# Patient Record
Sex: FEMALE | Race: WHITE | NOT HISPANIC OR LATINO | Employment: OTHER | URBAN - METROPOLITAN AREA
[De-identification: names, ages, dates, MRNs, and addresses within clinical notes are randomized per-mention and may not be internally consistent; named-entity substitution may affect disease eponyms.]

---

## 2017-08-29 ENCOUNTER — HOSPITAL ENCOUNTER (OUTPATIENT)
Dept: CARDIOLOGY | Facility: HOSPITAL | Age: 72
Discharge: HOME | End: 2017-08-29
Attending: INTERNAL MEDICINE

## 2017-08-29 ENCOUNTER — OFFICE VISIT - HEALTHEAST (OUTPATIENT)
Dept: CARDIOLOGY | Facility: CLINIC | Age: 72
End: 2017-08-29

## 2017-08-29 DIAGNOSIS — I48.20 CHRONIC ATRIAL FIBRILLATION (H): ICD-10-CM

## 2017-08-29 DIAGNOSIS — I66.9 CEREBRAL ARTERY OCCLUSION: ICD-10-CM

## 2017-08-29 DIAGNOSIS — I35.8 AORTIC VALVE SCLEROSIS: ICD-10-CM

## 2017-08-29 DIAGNOSIS — I10 ESSENTIAL HYPERTENSION: ICD-10-CM

## 2017-08-29 LAB
AORTIC ROOT: 2.6 CM
AORTIC VALVE MEAN VELOCITY: 130 CM/S
AV DIMENSIONLESS INDEX VTI: 0.5
AV MEAN GRADIENT: 7 MMHG
AV PEAK GRADIENT: 11.4 MMHG
AV VALVE AREA: 1.6 CM2
AV VELOCITY RATIO: 0.5
BSA FOR ECHO PROCEDURE: 1.9 M2
CV BLOOD PRESSURE: NORMAL MMHG
CV ECHO HEIGHT: 67 IN
CV ECHO WEIGHT: 168 LBS
DOP CALC AO PEAK VEL: 169 CM/S
DOP CALC AO VTI: 40.7 CM
DOP CALC LVOT AREA: 3.14 CM2
DOP CALC LVOT DIAMETER: 2 CM
DOP CALC LVOT PEAK VEL: 92.9 CM/S
DOP CALC LVOT STROKE VOLUME: 65 CM3
DOP CALCLVOT PEAK VEL VTI: 20.7 CM
EJECTION FRACTION: 66 % (ref 55–75)
FRACTIONAL SHORTENING: 26.5 % (ref 28–44)
INTERVENTRICULAR SEPTUM IN END DIASTOLE: 1.2 CM (ref 0.6–0.9)
IVS/PW RATIO: 1.3
LA AREA 1: 32.4 CM2
LA AREA 2: 22.1 CM2
LEFT ATRIUM LENGTH: 5.9 CM
LEFT ATRIUM SIZE: 3.8 CM
LEFT ATRIUM TO AORTIC ROOT RATIO: 1.46 NO UNITS
LEFT ATRIUM VOLUME INDEX: 54.3 ML/M2
LEFT ATRIUM VOLUME: 103.2 CM3
LEFT VENTRICLE CARDIAC INDEX: 2.1 L/MIN/M2
LEFT VENTRICLE CARDIAC OUTPUT: 3.9 L/MIN
LEFT VENTRICLE DIASTOLIC VOLUME INDEX: 40.6 CM3/M2 (ref 34–74)
LEFT VENTRICLE DIASTOLIC VOLUME: 77.2 CM3 (ref 46–106)
LEFT VENTRICLE HEART RATE: 60 BPM
LEFT VENTRICLE MASS INDEX: 79.6 G/M2
LEFT VENTRICLE SYSTOLIC VOLUME INDEX: 13.8 CM3/M2 (ref 11–31)
LEFT VENTRICLE SYSTOLIC VOLUME: 26.3 CM3 (ref 14–42)
LEFT VENTRICULAR INTERNAL DIMENSION IN DIASTOLE: 4.19 CM (ref 3.8–5.2)
LEFT VENTRICULAR INTERNAL DIMENSION IN SYSTOLE: 3.08 CM (ref 2.2–3.5)
LEFT VENTRICULAR MASS: 151.3 G
LEFT VENTRICULAR OUTFLOW TRACT MEAN GRADIENT: 2 MMHG
LEFT VENTRICULAR OUTFLOW TRACT MEAN VELOCITY: 62.3 CM/S
LEFT VENTRICULAR OUTFLOW TRACT PEAK GRADIENT: 3 MMHG
LEFT VENTRICULAR POSTERIOR WALL IN END DIASTOLE: 0.95 CM (ref 0.6–0.9)
LV STROKE VOLUME INDEX: 34.2 ML/M2
MITRAL VALVE DECELERATION SLOPE: 6310 MM/S2
MITRAL VALVE PRESSURE HALF-TIME: 48 MS
MV AVERAGE E/E' RATIO: 9.1 CM/S
MV DECELERATION TIME: 134 MS
MV E'TISSUE VEL-LAT: 14.6 CM/S
MV E'TISSUE VEL-MED: 8.32 CM/S
MV LATERAL E/E' RATIO: 7.1
MV MEDIAL E/E' RATIO: 12.5
MV PEAK E VELOCITY: 104 CM/S
MV VALVE AREA PRESSURE 1/2 METHOD: 4.6 CM2
NUC REST DIASTOLIC VOLUME INDEX: 2688 LBS
NUC REST SYSTOLIC VOLUME INDEX: 67 IN
TRICUSPID REGURGITATION PEAK PRESSURE GRADIENT: 32.3 MMHG
TRICUSPID VALVE ANULAR PLANE SYSTOLIC EXCURSION: 1.6 CM
TRICUSPID VALVE PEAK REGURGITANT VELOCITY: 284 CM/S

## 2017-08-29 RX ORDER — TIMOLOL MALEATE 5 MG/ML
1 SOLUTION/ DROPS OPHTHALMIC DAILY
Status: SHIPPED | COMMUNITY
Start: 2017-08-21

## 2017-08-29 ASSESSMENT — MIFFLIN-ST. JEOR: SCORE: 1274.67

## 2017-08-30 ENCOUNTER — AMBULATORY - HEALTHEAST (OUTPATIENT)
Dept: CARDIOLOGY | Facility: CLINIC | Age: 72
End: 2017-08-30

## 2017-08-30 DIAGNOSIS — I10 HTN (HYPERTENSION): ICD-10-CM

## 2017-09-02 ENCOUNTER — AMBULATORY - HEALTHEAST (OUTPATIENT)
Dept: CARDIOLOGY | Facility: CLINIC | Age: 72
End: 2017-09-02

## 2017-09-02 DIAGNOSIS — I10 UNCONTROLLED HYPERTENSION: ICD-10-CM

## 2017-09-06 ENCOUNTER — COMMUNICATION - HEALTHEAST (OUTPATIENT)
Dept: CARDIOLOGY | Facility: CLINIC | Age: 72
End: 2017-09-06

## 2017-09-06 DIAGNOSIS — I10 HTN (HYPERTENSION): ICD-10-CM

## 2017-09-08 ENCOUNTER — COMMUNICATION - HEALTHEAST (OUTPATIENT)
Dept: CARDIOLOGY | Facility: CLINIC | Age: 72
End: 2017-09-08

## 2017-09-11 ENCOUNTER — AMBULATORY - HEALTHEAST (OUTPATIENT)
Dept: CARDIOLOGY | Facility: CLINIC | Age: 72
End: 2017-09-11

## 2017-09-11 DIAGNOSIS — I10 HTN (HYPERTENSION): ICD-10-CM

## 2017-09-15 ENCOUNTER — COMMUNICATION - HEALTHEAST (OUTPATIENT)
Dept: CARDIOLOGY | Facility: CLINIC | Age: 72
End: 2017-09-15

## 2017-10-30 ENCOUNTER — AMBULATORY - HEALTHEAST (OUTPATIENT)
Dept: SCHEDULING | Facility: CLINIC | Age: 72
End: 2017-10-30

## 2017-10-30 ENCOUNTER — RECORDS - HEALTHEAST (OUTPATIENT)
Dept: ADMINISTRATIVE | Facility: OTHER | Age: 72
End: 2017-10-30

## 2017-10-30 DIAGNOSIS — M81.0 OSTEOPOROSIS: ICD-10-CM

## 2018-09-07 ENCOUNTER — OFFICE VISIT - HEALTHEAST (OUTPATIENT)
Dept: CARDIOLOGY | Facility: CLINIC | Age: 73
End: 2018-09-07

## 2018-09-07 DIAGNOSIS — I48.20 CHRONIC ATRIAL FIBRILLATION (H): ICD-10-CM

## 2018-09-07 DIAGNOSIS — I10 ESSENTIAL HYPERTENSION: ICD-10-CM

## 2018-09-07 RX ORDER — CLINDAMYCIN HCL 150 MG
150 CAPSULE ORAL DAILY
Status: SHIPPED | COMMUNITY
Start: 2017-07-19

## 2018-09-07 RX ORDER — ALENDRONATE SODIUM 70 MG/1
70 TABLET ORAL DAILY
Status: SHIPPED | COMMUNITY
Start: 2017-12-05

## 2018-09-07 RX ORDER — POTASSIUM CHLORIDE 1500 MG/1
20 TABLET, EXTENDED RELEASE ORAL DAILY
Status: SHIPPED | COMMUNITY
Start: 2017-01-17

## 2019-04-23 ENCOUNTER — COMMUNICATION - HEALTHEAST (OUTPATIENT)
Dept: ANTICOAGULATION | Facility: CLINIC | Age: 74
End: 2019-04-23

## 2019-04-23 DIAGNOSIS — I48.91 A-FIB (H): ICD-10-CM

## 2019-09-13 ENCOUNTER — OFFICE VISIT - HEALTHEAST (OUTPATIENT)
Dept: CARDIOLOGY | Facility: CLINIC | Age: 74
End: 2019-09-13

## 2019-09-13 DIAGNOSIS — I66.9 CEREBRAL ARTERY OCCLUSION: ICD-10-CM

## 2019-09-13 DIAGNOSIS — I27.29 PULMONARY HYPERTENSIVE VENOUS DISEASE (H): ICD-10-CM

## 2019-09-13 DIAGNOSIS — I48.20 CHRONIC ATRIAL FIBRILLATION (H): ICD-10-CM

## 2019-09-13 ASSESSMENT — MIFFLIN-ST. JEOR: SCORE: 1174.88

## 2019-09-20 ENCOUNTER — HOSPITAL ENCOUNTER (OUTPATIENT)
Dept: CARDIOLOGY | Facility: HOSPITAL | Age: 74
Discharge: HOME | End: 2019-09-20
Attending: INTERNAL MEDICINE

## 2019-09-20 DIAGNOSIS — I27.29 PULMONARY HYPERTENSIVE VENOUS DISEASE (H): ICD-10-CM

## 2019-09-20 DIAGNOSIS — I48.20 CHRONIC ATRIAL FIBRILLATION (H): ICD-10-CM

## 2019-09-20 ASSESSMENT — MIFFLIN-ST. JEOR: SCORE: 1174.88

## 2019-09-23 LAB
AORTIC ROOT: 2.9 CM
AORTIC VALVE MEAN VELOCITY: 132 CM/S
AV CUSP SEPERATION: 0.7 CM
AV CUSP SEPERATION: 0.7 CM
AV DIMENSIONLESS INDEX VTI: 0.6
AV MEAN GRADIENT: 7 MMHG
AV PEAK GRADIENT: 11.8 MMHG
AV VALVE AREA: 1.3 CM2
BSA FOR ECHO PROCEDURE: 1.77 M2
CV ECHO HEIGHT: 67 IN
CV ECHO WEIGHT: 146 LBS
DOP CALC AO PEAK VEL: 172 CM/S
DOP CALC AO VTI: 40.7 CM
DOP CALC LVOT AREA: 2.27 CM2
DOP CALC LVOT DIAMETER: 1.7 CM
DOP CALC LVOT STROKE VOLUME: 52 CM3
DOP CALC MV VTI: 20.8 CM
DOP CALCLVOT PEAK VEL VTI: 22.9 CM
EJECTION FRACTION: 64 % (ref 55–75)
FRACTIONAL SHORTENING: 16.7 % (ref 28–44)
INTERVENTRICULAR SEPTUM IN END DIASTOLE: 1.1 CM (ref 0.6–0.9)
IVS/PW RATIO: 0.9
LA AREA 1: 21 CM2
LA AREA 2: 16.1 CM2
LEFT ATRIUM LENGTH: 5.38 CM
LEFT ATRIUM SIZE: 3.3 CM
LEFT ATRIUM VOLUME INDEX: 30.2 ML/M2
LEFT ATRIUM VOLUME: 53.4 ML
LEFT VENTRICLE CARDIAC INDEX: 1.9 L/MIN/M2
LEFT VENTRICLE CARDIAC OUTPUT: 3.3 L/MIN
LEFT VENTRICLE DIASTOLIC VOLUME INDEX: 18.6 CM3/M2 (ref 29–61)
LEFT VENTRICLE DIASTOLIC VOLUME: 33 CM3 (ref 46–106)
LEFT VENTRICLE HEART RATE: 64 BPM
LEFT VENTRICLE MASS INDEX: 74.9 G/M2
LEFT VENTRICLE SYSTOLIC VOLUME INDEX: 6.8 CM3/M2 (ref 8–24)
LEFT VENTRICLE SYSTOLIC VOLUME: 12 CM3 (ref 14–42)
LEFT VENTRICULAR INTERNAL DIMENSION IN DIASTOLE: 3.6 CM (ref 3.8–5.2)
LEFT VENTRICULAR INTERNAL DIMENSION IN SYSTOLE: 3 CM (ref 2.2–3.5)
LEFT VENTRICULAR MASS: 132.7 G
LEFT VENTRICULAR OUTFLOW TRACT MEAN GRADIENT: 2 MMHG
LEFT VENTRICULAR OUTFLOW TRACT MEAN VELOCITY: 68.9 CM/S
LEFT VENTRICULAR POSTERIOR WALL IN END DIASTOLE: 1.2 CM (ref 0.6–0.9)
LV STROKE VOLUME INDEX: 29.4 ML/M2
MITRAL VALVE DECELERATION SLOPE: 4260 MM/S2
MITRAL VALVE E/A RATIO: 13.1
MITRAL VALVE MEAN INFLOW VELOCITY: 58.1 CM/S
MITRAL VALVE PEAK VELOCITY: 113 CM/S
MITRAL VALVE PRESSURE HALF-TIME: 80 MS
MV AREA VTI: 2.5 CM2
MV AVERAGE E/E' RATIO: 9.8 CM/S
MV DECELERATION TIME: 130 MS
MV E'TISSUE VEL-LAT: 11.6 CM/S
MV E'TISSUE VEL-MED: 7.31 CM/S
MV LATERAL E/E' RATIO: 8
MV MEAN GRADIENT: 2 MMHG
MV MEDIAL E/E' RATIO: 12.7
MV PEAK A VELOCITY: 7.06 CM/S
MV PEAK E VELOCITY: 92.8 CM/S
MV PEAK GRADIENT: 5.1 MMHG
MV VALVE AREA BY CONTINUITY EQUATION: 2.5 CM2
MV VALVE AREA PRESSURE 1/2 METHOD: 2.8 CM2
NUC REST DIASTOLIC VOLUME INDEX: 2336 LBS
NUC REST SYSTOLIC VOLUME INDEX: 67 IN
PR MAX PG: 7 MMHG
PR PEAK VELOCITY: 130 CM/S
TRICUSPID REGURGITATION PEAK PRESSURE GRADIENT: 27.7 MMHG
TRICUSPID VALVE ANULAR PLANE SYSTOLIC EXCURSION: 1.2 CM
TRICUSPID VALVE PEAK REGURGITANT VELOCITY: 263 CM/S

## 2019-10-07 ENCOUNTER — COMMUNICATION - HEALTHEAST (OUTPATIENT)
Dept: CARDIOLOGY | Facility: CLINIC | Age: 74
End: 2019-10-07

## 2019-10-07 DIAGNOSIS — I10 UNCONTROLLED HYPERTENSION: ICD-10-CM

## 2019-10-17 ENCOUNTER — COMMUNICATION - HEALTHEAST (OUTPATIENT)
Dept: CARDIOLOGY | Facility: CLINIC | Age: 74
End: 2019-10-17

## 2019-10-17 DIAGNOSIS — I10 UNCONTROLLED HYPERTENSION: ICD-10-CM

## 2019-10-17 RX ORDER — LISINOPRIL AND HYDROCHLOROTHIAZIDE 12.5; 2 MG/1; MG/1
1 TABLET ORAL DAILY
Refills: 0 | Status: SHIPPED
Start: 2019-10-17

## 2019-11-08 ENCOUNTER — COMMUNICATION - HEALTHEAST (OUTPATIENT)
Dept: CARDIOLOGY | Facility: CLINIC | Age: 74
End: 2019-11-08

## 2019-11-25 ENCOUNTER — COMMUNICATION - HEALTHEAST (OUTPATIENT)
Dept: CARDIOLOGY | Facility: CLINIC | Age: 74
End: 2019-11-25

## 2019-12-06 ENCOUNTER — RECORDS - HEALTHEAST (OUTPATIENT)
Dept: ADMINISTRATIVE | Facility: OTHER | Age: 74
End: 2019-12-06

## 2019-12-17 ENCOUNTER — COMMUNICATION - HEALTHEAST (OUTPATIENT)
Dept: CARDIOLOGY | Facility: CLINIC | Age: 74
End: 2019-12-17

## 2019-12-19 ENCOUNTER — AMBULATORY - HEALTHEAST (OUTPATIENT)
Dept: CARDIOLOGY | Facility: CLINIC | Age: 74
End: 2019-12-19

## 2019-12-19 DIAGNOSIS — I48.0 PAROXYSMAL ATRIAL FIBRILLATION (H): ICD-10-CM

## 2019-12-19 LAB
ATRIAL RATE - MUSE: NORMAL
DIASTOLIC BLOOD PRESSURE - MUSE: NORMAL
INTERPRETATION ECG - MUSE: NORMAL
P AXIS - MUSE: NORMAL
PR INTERVAL - MUSE: NORMAL
QRS DURATION - MUSE: 100 MS
QT - MUSE: 392 MS
QTC - MUSE: 401 MS
R AXIS - MUSE: 13 DEGREES
SYSTOLIC BLOOD PRESSURE - MUSE: NORMAL
T AXIS - MUSE: 14 DEGREES
VENTRICULAR RATE- MUSE: 63 BPM

## 2019-12-19 ASSESSMENT — MIFFLIN-ST. JEOR: SCORE: 1159

## 2019-12-23 ENCOUNTER — AMBULATORY - HEALTHEAST (OUTPATIENT)
Dept: CARDIOLOGY | Facility: CLINIC | Age: 74
End: 2019-12-23

## 2020-10-29 ENCOUNTER — COMMUNICATION - HEALTHEAST (OUTPATIENT)
Dept: CARDIOLOGY | Facility: CLINIC | Age: 75
End: 2020-10-29

## 2020-10-30 ENCOUNTER — OFFICE VISIT - HEALTHEAST (OUTPATIENT)
Dept: CARDIOLOGY | Facility: CLINIC | Age: 75
End: 2020-10-30

## 2020-10-30 DIAGNOSIS — I35.8 AORTIC VALVE SCLEROSIS: ICD-10-CM

## 2020-10-30 DIAGNOSIS — I48.20 CHRONIC ATRIAL FIBRILLATION (H): ICD-10-CM

## 2020-10-30 DIAGNOSIS — I27.29 PULMONARY HYPERTENSIVE VENOUS DISEASE (H): ICD-10-CM

## 2020-10-30 ASSESSMENT — MIFFLIN-ST. JEOR: SCORE: 1154.46

## 2020-11-17 ENCOUNTER — HOSPITAL ENCOUNTER (OUTPATIENT)
Dept: CARDIOLOGY | Facility: HOSPITAL | Age: 75
Discharge: HOME | End: 2020-11-17
Attending: INTERNAL MEDICINE

## 2020-11-17 DIAGNOSIS — I27.29 PULMONARY HYPERTENSIVE VENOUS DISEASE (H): ICD-10-CM

## 2020-11-17 DIAGNOSIS — I48.20 CHRONIC ATRIAL FIBRILLATION (H): ICD-10-CM

## 2020-11-17 DIAGNOSIS — I35.8 AORTIC VALVE SCLEROSIS: ICD-10-CM

## 2020-11-17 LAB
AORTIC ROOT: 3.1 CM
AORTIC VALVE MEAN VELOCITY: 142 CM/S
ASCENDING AORTA: 3.3 CM
AV DIMENSIONLESS INDEX VTI: 0.5
AV MEAN GRADIENT: 9 MMHG
AV PEAK GRADIENT: 18.5 MMHG
AV VALVE AREA: 1.4 CM2
BSA FOR ECHO PROCEDURE: 1.73 M2
CV BLOOD PRESSURE: ABNORMAL MMHG
CV ECHO HEIGHT: 68 IN
CV ECHO WEIGHT: 138 LBS
DOP CALC AO PEAK VEL: 215 CM/S
DOP CALC AO VTI: 50.5 CM
DOP CALC LVOT AREA: 3.14 CM2
DOP CALC LVOT DIAMETER: 2 CM
DOP CALC LVOT STROKE VOLUME: 72.5 CM3
DOP CALC MV VTI: 25.7 CM
DOP CALCLVOT PEAK VEL VTI: 23.1 CM
EJECTION FRACTION: 65 % (ref 55–75)
FRACTIONAL SHORTENING: 35.7 % (ref 28–44)
INTERVENTRICULAR SEPTUM IN END DIASTOLE: 1 CM (ref 0.6–0.9)
IVS/PW RATIO: 0.8
LA AREA 1: 25.4 CM2
LA AREA 2: 32.5 CM2
LEFT ATRIUM LENGTH: 7.18 CM
LEFT ATRIUM SIZE: 4 CM
LEFT ATRIUM TO AORTIC ROOT RATIO: 1.29 NO UNITS
LEFT ATRIUM VOLUME INDEX: 56.5 ML/M2
LEFT ATRIUM VOLUME: 97.7 ML
LEFT VENTRICLE CARDIAC INDEX: 3.1 L/MIN/M2
LEFT VENTRICLE CARDIAC OUTPUT: 5.4 L/MIN
LEFT VENTRICLE DIASTOLIC VOLUME INDEX: 30.1 CM3/M2 (ref 29–61)
LEFT VENTRICLE DIASTOLIC VOLUME: 52 CM3 (ref 46–106)
LEFT VENTRICLE HEART RATE: 74 BPM
LEFT VENTRICLE MASS INDEX: 96.8 G/M2
LEFT VENTRICLE SYSTOLIC VOLUME INDEX: 10.4 CM3/M2 (ref 8–24)
LEFT VENTRICLE SYSTOLIC VOLUME: 18 CM3 (ref 14–42)
LEFT VENTRICULAR INTERNAL DIMENSION IN DIASTOLE: 4.2 CM (ref 3.8–5.2)
LEFT VENTRICULAR INTERNAL DIMENSION IN SYSTOLE: 2.7 CM (ref 2.2–3.5)
LEFT VENTRICULAR MASS: 167.4 G
LEFT VENTRICULAR OUTFLOW TRACT MEAN GRADIENT: 2 MMHG
LEFT VENTRICULAR OUTFLOW TRACT MEAN VELOCITY: 63.7 CM/S
LEFT VENTRICULAR POSTERIOR WALL IN END DIASTOLE: 1.3 CM (ref 0.6–0.9)
LV STROKE VOLUME INDEX: 41.9 ML/M2
MITRAL VALVE E/A RATIO: 2.7
MITRAL VALVE MEAN INFLOW VELOCITY: 56.8 CM/S
MITRAL VALVE PEAK VELOCITY: 142 CM/S
MV AREA VTI: 2.82 CM2
MV AVERAGE E/E' RATIO: 13.6 CM/S
MV DECELERATION TIME: 158 MS
MV E'TISSUE VEL-LAT: 12.4 CM/S
MV E'TISSUE VEL-MED: 6.23 CM/S
MV LATERAL E/E' RATIO: 10.2
MV MEAN GRADIENT: 2 MMHG
MV MEDIAL E/E' RATIO: 20.4
MV PEAK A VELOCITY: 46.9 CM/S
MV PEAK E VELOCITY: 127 CM/S
MV PEAK GRADIENT: 8.1 MMHG
MV VALVE AREA BY CONTINUITY EQUATION: 2.8 CM2
NUC REST DIASTOLIC VOLUME INDEX: 2208 LBS
NUC REST SYSTOLIC VOLUME INDEX: 68 IN
TRICUSPID REGURGITATION PEAK PRESSURE GRADIENT: 30 MMHG
TRICUSPID VALVE ANULAR PLANE SYSTOLIC EXCURSION: 1.7 CM
TRICUSPID VALVE PEAK REGURGITANT VELOCITY: 274 CM/S

## 2020-11-17 ASSESSMENT — MIFFLIN-ST. JEOR: SCORE: 1154.46

## 2020-11-25 ENCOUNTER — AMBULATORY - HEALTHEAST (OUTPATIENT)
Dept: CARDIOLOGY | Facility: CLINIC | Age: 75
End: 2020-11-25

## 2021-05-31 ENCOUNTER — RECORDS - HEALTHEAST (OUTPATIENT)
Dept: ADMINISTRATIVE | Facility: CLINIC | Age: 76
End: 2021-05-31

## 2021-05-31 VITALS — HEIGHT: 67 IN | WEIGHT: 168 LBS | BODY MASS INDEX: 26.37 KG/M2

## 2021-06-02 VITALS — BODY MASS INDEX: 22.71 KG/M2 | WEIGHT: 145 LBS

## 2021-06-02 NOTE — TELEPHONE ENCOUNTER
Return call to patient who stated PCP prescribed her Lisinopril-HCTZ 20-12.5mg daily to replace  10-12.5mg dose she was previously taking in response to elevated BP she had reported 10-7-19 (refer to phone note) - patient stated she just rec'd Rx and plans to start it tomorrow - pharmacist had warned her of potential side effects of dizziness - patient inquired about when she should have her BP jose'd.    Instructed patient to have BP jose'd in 1wk after dose change and to report to PCP if sx of dizziness are not tolerated - patient verbalized understanding - med list updated.  mg

## 2021-06-02 NOTE — TELEPHONE ENCOUNTER
Return call to patient who confirmed she had jose of BP 10-2-19 which was 141/73 but has not rec'd return call from PCP regarding any medication changes so her spouse recommended she contact Dr. Pereyra.  Patient is not at home but agreed to call and clarify present dose of Lisinopril/HCTZ, then update would be forwarded to Dr. Pereyra.  mg

## 2021-06-02 NOTE — TELEPHONE ENCOUNTER
Return call to [facility] care coordinator Jeanie - informed her of Dr. Pereyra's response/recommendations - understanding verbalized.  mg

## 2021-06-02 NOTE — TELEPHONE ENCOUNTER
----- Message from Manisha Francis sent at 10/17/2019 10:16 AM CDT -----  Regarding: L  Caller: STEFANY     Primary cardiologist: Dr. Pereyra    Detailed reason for call: STEFANY needs verbal or faxed hold and/or bridge anticoagulation order. Please call back or fax info.    New or active symptoms? n/a     Best phone number: [facility] [phone], ext [phone]     Best time to contact: Any     Ok to leave a detailed message? Yes     Device? No     Additional Info: STEFANY Fax [phone]

## 2021-06-02 NOTE — TELEPHONE ENCOUNTER
----- Message from Aaron Briones sent at 10/17/2019  2:34 PM CDT -----  Regarding: Pt has medication question  General phone call:    Caller: Pt    Primary cardiologist: Dr Pereyra    Detailed reason for call: Pt has a question on her lisinopril-hydrochlorothiazide (ZESTORETIC) 20-25 mg per tablet - dose question - (Dr Hubbard her PCP suggested an increase in dose)  She wanted a call back to disscuss    New or active symptoms? na  Best phone number: [phone]  Best time to contact: any  Ok to leave a detailed message? yes  Device? no    Additional Info: thank you

## 2021-06-02 NOTE — TELEPHONE ENCOUNTER
----- Message from Tamia Majano sent at 10/7/2019  1:44 PM CDT -----  General phone call:    Caller: Patient  Primary cardiologist: Dr. Pereyra  Detailed reason for call: Patient is now available to call regarding her test  New or active symptoms?   Best phone number: [phone]  Best time to contact: Anytime  Ok to leave a detailed message? yes  Device? no    Additional Info:

## 2021-06-02 NOTE — TELEPHONE ENCOUNTER
"Phone call to patient - informed her of Dr. Pereyra's response - patient verbalized understanding and stated her PCP recommended abd CT which is sched for 10-22-19 because she has been experiencing \"gurgling in her stomach which is embarrassing\" - reiterated to patient that PCP can determine need for sooner f/u with Dr. Pereyra for BP mgmt if needed.  mg  "

## 2021-06-02 NOTE — TELEPHONE ENCOUNTER
"Per PCP 9-27-19 clinic note \"HTN: BP elevated here today. Not realized until after visit. Will have nursing call patient on Monday. BP normal at recent cardiology visit. Will have her return for a nurse appt to recheck BP if able and if still elevated, would increase lisinopril-HCTZ to 20mg-12.5mg daily.\"    Left message for patient requesting call back to address questions/concerns.  mg    "

## 2021-06-02 NOTE — TELEPHONE ENCOUNTER
----- Message from Stefany Abdi sent at 10/7/2019  9:50 AM CDT -----  General phone call:  PATIENT IS HAVING ELEVATED BLOOD PRESSURE, AND DR SEXTON WANTED TO KNOW WHEN THIS HAPPENED.  THIS WAS CHECKED AT [facility].  NURSE TOLD HER SHE WOULD GET A CALL FROM PCP, SHE IS STILL WAITING FOR THAT CALL SINCE LAST Wednesday.  CAN YOU PLEASE CALL THE PATIENT ABOUT HER ELEVATED BLOOD PRESSURE?  Caller: PATIENT  Primary cardiologist: DR SEXTON  Detailed reason for call: SEE ABOVE  New or active symptoms? YES, SEE ABOVE  Best phone number: [phone]  Best time to contact: ANY TIME  Ok to leave a detailed message? YES  Device? NO    Additional Info:

## 2021-06-02 NOTE — TELEPHONE ENCOUNTER
Rec'd return call from patient who stated she is presently taking Lisinopril/HCTZ 10-12.5mg daily and Atenolol 25mg daily - med listed updated to reflect current ZESTORETIC dose - informed patient that update would be forwarded to Dr. Pereyra for recommendations - patient verbalized understanding.    Please review patient update, PCP 9-27-19 visit note and 10-2-19 nurse only encounter - any new orders?  mg

## 2021-06-03 VITALS
HEIGHT: 67 IN | BODY MASS INDEX: 22.91 KG/M2 | DIASTOLIC BLOOD PRESSURE: 82 MMHG | RESPIRATION RATE: 10 BRPM | SYSTOLIC BLOOD PRESSURE: 110 MMHG | WEIGHT: 146 LBS | HEART RATE: 54 BPM

## 2021-06-03 VITALS — BODY MASS INDEX: 22.91 KG/M2 | WEIGHT: 146 LBS | HEIGHT: 67 IN

## 2021-06-03 NOTE — TELEPHONE ENCOUNTER
Dr. Pereyra,  Please see patient concerns below.  Patient and her  are requesting you to please recommend a GI specialist for her newly diagnosed pancreatic cyst.  Any recommendations?  Thank you,  Gris  --------------------------------  Called patient to discuss concerns. Patient and her  are requesting Dr. Pereyra to please review the 11/6/19 [facility] note in Care Everywhere and Imaging completed 11/2/19- abdominal MRI showing a pancreatic cyst.  Patient is frustrated over how long it is taking to get into [facility] GI specialist; therefore, are in search for one in the [facility] system.  Patient and her , Edvin, would like a recommendation of a GI specialist, in pancreatic cysts, from Dr. Pereyra.    Informed patient Dr. Pereyra is out of the clinic today and will not be addressed until next week. She verbalized understanding and is agreeable to plan.

## 2021-06-03 NOTE — TELEPHONE ENCOUNTER
----- Message from Debbie Godinez sent at 11/8/2019  1:57 PM CST -----  General phone call:    Caller: Pt and  came to clinic because they can't get through on phones  Primary cardiologist: Hafsa  Detailed reason for call: Pt diagnosed w/ cyst? on pancreas and want your help to find a [facility] doc  New or active symptoms? yes  Best phone number: [facility] @ [phone]  Best time to contact:   Ok to leave a detailed message?   Device? No    Additional Info:

## 2021-06-03 NOTE — TELEPHONE ENCOUNTER
Called patient's , Edvin, with message below.  [facility].  Patient is scheduled to have a colonoscopy 12/5/19. [facility] will be requesting Dr. Pereyra be notified of hold warfarin recommendation.

## 2021-06-03 NOTE — TELEPHONE ENCOUNTER
There are no GI specialist within the [facility] system.  [facility] is the group that services [facility].  I am uncertain if there is anyone who specializes in pancreatic cysts.  They could contact the clinic and see if they can provide names of people that specialize in pancreas disorders.    KL

## 2021-06-03 NOTE — TELEPHONE ENCOUNTER
Return call to patient's spouse - informed Edvin that he would need to contact patient's PCP to obtain Warfarin hold orders [facility] - Edvin verbalized understanding and agreed to f/u with PCP INR nurse.  mg

## 2021-06-04 VITALS
TEMPERATURE: 98 F | SYSTOLIC BLOOD PRESSURE: 127 MMHG | RESPIRATION RATE: 20 BRPM | WEIGHT: 139 LBS | BODY MASS INDEX: 21.07 KG/M2 | DIASTOLIC BLOOD PRESSURE: 78 MMHG | HEART RATE: 79 BPM | HEIGHT: 68 IN

## 2021-06-04 NOTE — TELEPHONE ENCOUNTER
Zestar Study Consent Visit    Study description: ECG and PPG Study: Zestar Study      Jennifer Wall a 74 y.o. female , was contacted by today to discuss participation in the Zestar study.     The patient called the Clinical Trials Office to inquire about study participation.  The consent form was reviewed with the patient and .     The review of the study included:    Study purpose     Conflict of interest    Device description      Study visits    Risks of participation    Benefits (if any)    Alternatives    Voluntary participation    Confidentiality     Compensation/costs of participation    Study stipends    Injury and legal rights    The subject was queried in regards to her willingness to continue and come in for scheduled appointment. her questions were answered to her satisfaction.   The patient has given her preliminary agreement to volunteer to participate in the above noted study.     Plan: Jennifer Wall will come to [facility] on 12/19/2019 [date] to continue consent process. If she continues to agrees to participate, the study visit will be done on the same day.  she was instructed to eat as usual before coming for study visit and take medications as usual too. she was encouraged to wear comfortable clothes and shoes to the study appointment.       Ayla Marshall RN

## 2021-06-05 VITALS
DIASTOLIC BLOOD PRESSURE: 76 MMHG | WEIGHT: 138 LBS | SYSTOLIC BLOOD PRESSURE: 136 MMHG | BODY MASS INDEX: 20.92 KG/M2 | HEART RATE: 64 BPM | RESPIRATION RATE: 16 BRPM | HEIGHT: 68 IN

## 2021-06-05 VITALS — BODY MASS INDEX: 20.92 KG/M2 | WEIGHT: 138 LBS | HEIGHT: 68 IN

## 2021-06-12 NOTE — PATIENT INSTRUCTIONS - HE
1. Continue current medications  2. Set up echo and holter to follow up on valve and heart rate. We will call with results.

## 2021-06-12 NOTE — TELEPHONE ENCOUNTER
Wellness Screening Tool  Symptom Screening:  Do you have one of the following NEW symptoms:    Fever (subjective or >100.0)?  No    A new cough?  No    Shortness of breath?  No     Chills? No     New loss of taste or smell? No     Generalized body aches? No     New persistent headache? No     New sore throat? No     Nausea, vomiting, or diarrhea?  No    Within the past 2 weeks, have you been exposed to someone with a known positive illness below:    COVID-19 (known or suspected)?  No    Chicken pox?  No    Mealses?  No    Pertussis?  No    Patient notified of visitor policy- They may have one person accompany them to their appointment, but they will need to wear a mask and will be screened upon arrival for symptoms: Yes  Pt informed to wear a mask: Yes  Pt notified if they develop any symptoms listed above, prior to their appointment, they are to call the clinic directly at [phone] for further instructions.  Yes  Patient's appointment status: Patient will be seen in clinic as scheduled on 10/30

## 2021-06-25 NOTE — PROGRESS NOTES
Progress Notes by Yolis Pereyra MD at 8/29/2017  8:50 AM     Author: Yolis Pereyra MD Service: -- Author Type: Physician    Filed: 8/29/2017  9:11 AM Encounter Date: 8/29/2017 Status: Signed    : Yolis Pereyra MD (Physician)           Click to link to [facility] HEART CARE NOTE    Thank you, Dr. Dean, for asking the [facility] Care team to see Ms. Jennifer Wall to follow-up on chronic atrial fibrillation and mild valvular heart disease.    Assessment/Recommendations   Assessment:    1.  Chronic atrial fibrillation, rate well controlled.  Jennifer remains on anticoagulation to reduce her risks of thromboembolic events.    2.  Mild valvular heart disease.  Her last echo 2-1/2 years ago suggested mild thickening of the aortic valve and mild mitral regurgitation.  I suspect she has mild aortic valve stenosis at this point.  Suggested we repeat an echocardiogram.  3.  Essential hypertension, inadequately controlled.  Her blood pressure was elevated when she arrived at 160/70.  May need follow-up of her blood pressures per primary physician    Plan:  1.  Continue current medications  2.  Schedule echocardiogram with further recommendations to follow       History of Present Illness    Ms. Jennifer Wall is a 72 y.o. female with history of chronic atrial fibrillation and mild mitral regurgitation who presents today for follow-up visit.  Since I last saw her 1-1/2 years ago, she has been doing well.  She denies any clear complaints of palpitations.  She denies any decline in exercise capacity.  She is beginning to have some issues with her balance.  No complaints of any chest discomfort or shortness of breath.    ECG (personally reviewed): No ECG today    Cardiac Imaging Studies (personally reviewed): No recent imaging studies     Physical Examination Review of Systems   Vitals:    08/29/17 0848   BP: 160/70   Pulse: 60   Resp: 16     Body mass index is 26.31 kg/(m^2).  Wt Readings from  Last 3 Encounters:   08/29/17 168 lb (76.2 kg)   01/13/16 174 lb (78.9 kg)   11/03/15 175 lb (79.4 kg)     General Appearance:   Awake, Alert, No acute distress.   HEENT:  No scleral icterus; the mucous membranes were pink and moist.   Neck: No cervical bruits or jugular venous distention    Chest: The spine was straight. The chest was symmetric.   Lungs:   Respirations unlabored; the lungs are clear to auscultation. No wheezing   Cardiovascular:    Irregularly irregular rhythm.  S1, S2 normal.  No murmur, gallop or rub   Abdomen:  No organomegaly, masses, bruits, or tenderness. Bowels sounds are present   Extremities:  No peripheral edema bilaterally. Varicose veins noted   Skin: No xanthelasma. Warm, Dry.   Musculoskeletal: No tenderness.   Neurologic: Mood and affect are appropriate.    General: Weight Loss  Eyes: WNL  Ears/Nose/Throat: WNL  Lungs: WNL  Heart: Irregular Heartbeat, Leg Swelling  Stomach: Constipation  Bladder: WNL  Muscle/Joints: WNL  Skin: WNL  Nervous System: WNL  Mental Health: WNL     Blood: Easy Bruising     Medical History  Surgical History Family History Social History   -Chronic atrial fibrillation  -Essential hypertension  -Mild mitral regurgitation  -Ischemic stroke  -Glaucoma Past Surgical History:   Procedure Laterality Date   ? HYSTERECTOMY      Family History   Problem Relation Age of Onset   ? No Medical Problems Mother    ? No Medical Problems Father    ? No Medical Problems Sister    ? No Medical Problems Daughter    ? No Medical Problems Maternal Grandmother    ? No Medical Problems Maternal Grandfather    ? No Medical Problems Paternal Grandmother    ? No Medical Problems Paternal Grandfather    ? No Medical Problems Maternal Aunt    ? No Medical Problems Paternal Aunt    ? BRCA 1/2 Neg Hx    ? Breast cancer Neg Hx    ? Cancer Neg Hx    ? Colon cancer Neg Hx    ? Endometrial cancer Neg Hx    ? Ovarian cancer Neg Hx     Social History     Social History   ? Marital status:       Spouse name: N/A   ? Number of children: N/A   ? Years of education: N/A     Occupational History   ? Not on file.     Social History Main Topics   ? Smoking status: Never Smoker   ? Smokeless tobacco: Never Used   ? Alcohol use Not on file   ? Drug use: Not on file   ? Sexual activity: Not on file     Other Topics Concern   ? Not on file     Social History Narrative          Medications  Allergies   Current Outpatient Prescriptions   Medication Sig Dispense Refill   ? atenolol (TENORMIN) 25 MG tablet TAKE ONE TABLET BY MOUTH EVERY DAY 90 tablet 3   ? atorvastatin (LIPITOR) 10 MG tablet TAKE ONE TABLET BY MOUTH ONCE DAILY 90 tablet 1   ? Ca-D3-mag#11-zinc-cupr-man-bor (CALTRATE 600+D PLUS MINERALS) 600 mg calcium- 800 unit-50 mg Tab Take 1 tablet by mouth 2 (two) times a day.     ? cholecalciferol, vitamin D3, 1,000 unit tablet Take 1,000 Units by mouth daily.     ? diphenoxylate-atropine (LOMOTIL) 2.5-0.025 mg per tablet Take 1-2 tablets by mouth 3 (three) times a day as needed for diarrhea.     ? fluticasone (FLONASE) 50 mcg/actuation nasal spray 2 sprays into each nostril daily.     ? multivitamin (ONE A DAY) per tablet      ? timolol maleate (TIMOPTIC) 0.5 % ophthalmic solution Administer 1 drop to the right eye daily.     ? warfarin (COUMADIN) 5 MG tablet Take 1 tablet daily 90 tablet 3     No current facility-administered medications for this visit.       Allergies   Allergen Reactions   ? Aspirin (Tartrazine Only)    ? Azithromycin    ? Latex    ? Morphine    ? Nitrofuran Analogues    ? Penicillins    ? Sulfa (Sulfonamide Antibiotics)          Lab Results    Chemistry/lipid CBC Cardiac Enzymes/BNP/TSH/INR   Lab Results   Component Value Date    CHOL 149 05/04/2015    HDL 68 05/04/2015    LDLCALC 67 05/04/2015    TRIG 71 05/04/2015    CREATININE 0.72 05/04/2015    BUN 13 05/04/2015    K 4.0 05/04/2015     05/04/2015     05/04/2015    CO2 26 05/04/2015    Lab Results   Component Value  Date    WBC 4.4 05/04/2015    HGB 13.6 05/04/2015    HCT 40.0 05/04/2015    MCV 96 05/04/2015     05/04/2015    Lab Results   Component Value Date    TSH 1.1 03/12/2013    INR 2.0 (!) 06/23/2016

## 2021-06-26 NOTE — PROGRESS NOTES
Progress Notes by Yolis Pereyra MD at 9/7/2018  9:30 AM     Author: Yolis Pereyra MD Service: -- Author Type: Physician    Filed: 9/7/2018  4:03 PM Encounter Date: 9/7/2018 Status: Signed    : Yolis Pereyra MD (Physician)           Click to link to [facility] HEART CARE NOTE    Thank you, Dr. Barba, for asking the [facility] Heart Care team to see Ms. Jennifer Wall to follow-up on chronic atrial fibrillation, essential hypertension and mild valvular disease.    Assessment/Recommendations   Assessment:    1.  Chronic atrial fibrillation, rate well controlled.  Patient remains on anticoagulation and minimize risks of thromboembolic events.  2.  Essential hypertension, controlled with addition of lisinopril-HCTZ.  3.  Aortic valve sclerosis.  This was documented on echocardiogram one year ago.  Will hold off on repeat echo this year.  4.  Weight loss.  Patient has had 22 pound weight loss over the past year.  She blames this on loss of taste and inability to smell.  She also reports new constipation issues.  I recommended follow-up with primary care physician.    Plan:  1.  Continue current medications  2.  Follow-up in 1 year or sooner if clinically indicated       History of Present Illness    Ms. Jennifer Wall is a 73 y.o. female with chronic atrial fibrillation, essential hypertension and mild valvular heart disease presents today for follow-up visit.  After her last visit a year ago, initiated lisinopril/HCTZ which resulted in significant improvement in her blood pressure.  Her lower extremity edema resolved.  She states she is feeling much better.  She does mention a significant weight loss over the past year which she attributes to poor taste and smell and the fact that she does not feel hungry.  She denies early satiety.  She has had issues with constipation recently.  Her  states her stomach is always making noise.  She has not discussed this with the primary care  physician and I encouraged her to do so.    ECG (personally reviewed): No ECG today    Cardiac Imaging Studies (personally reviewed): No recent imaging     Physical Examination Review of Systems   Vitals:    09/07/18 0934   BP: 122/76   Pulse: 60   Resp: 18     Body mass index is 22.71 kg/(m^2).  Wt Readings from Last 3 Encounters:   09/07/18 145 lb (65.8 kg)   08/29/17 168 lb (76.2 kg)   01/13/16 174 lb (78.9 kg)     General Appearance:   Awake, Alert, No acute distress.   HEENT:  No scleral icterus; the mucous membranes were pink and moist.   Neck: No cervical bruits or jugular venous distention    Chest: The spine was straight. The chest was symmetric.   Lungs:   Respirations unlabored; the lungs are clear to auscultation. No wheezing   Cardiovascular:    Irregularly irregular rhythm.  S1, S2 normal.  1/6 soft systolic murmur heard at the left upper sternal border.   Abdomen:  No organomegaly, masses, bruits, or tenderness. Bowels sounds are present   Extremities:  No peripheral edema bilaterally   Skin: No xanthelasma. Warm, Dry.   Musculoskeletal: No tenderness.   Neurologic: Mood and affect are appropriate.    General: Weight Loss  Eyes: WNL  Ears/Nose/Throat: WNL  Lungs: WNL  Heart: Irregular Heartbeat, Leg Swelling  Stomach: Constipation  Bladder: Frequent Urination at Night  Muscle/Joints: WNL  Skin: Rash  Nervous System: Loss of Balance  Mental Health: WNL     Blood: Easy Bruising     Medical History  Surgical History Family History Social History   Past Medical History:   Diagnosis Date   ? Atrial fibrillation (H)    ? Heart murmur    ? Hypertension    ? Stroke (H)     Past Surgical History:   Procedure Laterality Date   ? HYSTERECTOMY      Family History   Problem Relation Age of Onset   ? No Medical Problems Mother    ? No Medical Problems Father    ? No Medical Problems Sister    ? No Medical Problems Daughter    ? No Medical Problems Maternal Grandmother    ? No Medical Problems Maternal Grandfather     ? No Medical Problems Paternal Grandmother    ? No Medical Problems Paternal Grandfather    ? No Medical Problems Maternal Aunt    ? No Medical Problems Paternal Aunt    ? BRCA 1/2 Neg Hx    ? Breast cancer Neg Hx    ? Cancer Neg Hx    ? Colon cancer Neg Hx    ? Endometrial cancer Neg Hx    ? Ovarian cancer Neg Hx     Social History     Social History   ? Marital status:      Spouse name: N/A   ? Number of children: N/A   ? Years of education: N/A     Occupational History   ? Not on file.     Social History Main Topics   ? Smoking status: Never Smoker   ? Smokeless tobacco: Never Used   ? Alcohol use Not on file   ? Drug use: Not on file   ? Sexual activity: Not on file     Other Topics Concern   ? Not on file     Social History Narrative          Medications  Allergies   Current Outpatient Prescriptions   Medication Sig Dispense Refill   ? alendronate (FOSAMAX) 70 MG tablet Take 70 mg by mouth daily.     ? atenolol (TENORMIN) 25 MG tablet TAKE ONE TABLET BY MOUTH EVERY DAY 90 tablet 3   ? atorvastatin (LIPITOR) 10 MG tablet TAKE ONE TABLET BY MOUTH ONCE DAILY 90 tablet 1   ? Ca-D3-mag#11-zinc-cupr-man-bor (CALTRATE 600+D PLUS MINERALS) 600 mg calcium- 800 unit-50 mg Tab Take 1 tablet by mouth 2 (two) times a day.     ? cholecalciferol, vitamin D3, 1,000 unit tablet Take 1,000 Units by mouth daily.     ? clindamycin (CLEOCIN) 150 MG capsule Take 150 mg by mouth daily.     ? fluticasone (FLONASE) 50 mcg/actuation nasal spray 2 sprays into each nostril daily.     ? lisinopril-hydrochlorothiazide (ZESTORETIC) 20-12.5 mg per tablet Take 1 tablet by mouth daily. 30 tablet 1   ? multivitamin (ONE A DAY) per tablet      ? potassium chloride (K-DUR,KLOR-CON) 20 MEQ tablet Take 20 mEq by mouth daily.     ? timolol maleate (TIMOPTIC) 0.5 % ophthalmic solution Administer 1 drop to the right eye daily.     ? warfarin (COUMADIN) 5 MG tablet Take 1 tablet daily 90 tablet 3   ? diphenoxylate-atropine (LOMOTIL) 2.5-0.025  mg per tablet Take 1-2 tablets by mouth 3 (three) times a day as needed for diarrhea.       No current facility-administered medications for this visit.       Allergies   Allergen Reactions   ? Aspirin (Tartrazine Only)    ? Azithromycin    ? Latex    ? Morphine    ? Nitrofuran Analogues    ? Penicillins    ? Sulfa (Sulfonamide Antibiotics)          Lab Results    Chemistry/lipid CBC Cardiac Enzymes/BNP/TSH/INR   Lab Results   Component Value Date    CHOL 149 05/04/2015    HDL 68 05/04/2015    LDLCALC 67 05/04/2015    TRIG 71 05/04/2015    CREATININE 0.80 09/11/2017    BUN 17 09/11/2017    K 4.2 09/11/2017     09/11/2017     09/11/2017    CO2 27 09/11/2017    Lab Results   Component Value Date    WBC 4.4 05/04/2015    HGB 13.6 05/04/2015    HCT 40.0 05/04/2015    MCV 96 05/04/2015     05/04/2015    Lab Results   Component Value Date    TSH 1.1 03/12/2013    INR 2.0 (!) 06/23/2016

## 2021-06-28 NOTE — PROGRESS NOTES
Progress Notes by Kristen Hatch CNP at 12/19/2019 12:30 PM     Author: Kristen Hatch CNP Service: -- Author Type: Nurse Practitioner    Filed: 12/19/2019  4:02 PM Encounter Date: 12/19/2019 Status: Signed    : Kristen Hatch CNP (Nurse Practitioner)        Zestar Study     Zestar Study    Physical Examination  For abnormal findings, please evaluate if the finding is Clinically Significant (by 'CS') or Not Clinically Significant (by 'NCS')  General Appearance Normal  Head and Neck  Normal  Lungs    Normal  Cardiovascular NCS irregular irregular with murmur   Abdomen Abnormal- NCS slight tenderness   Musculoskeletal/ExtremitiesAbnormal- NCS right ankle +3 compared to the left +2    Lymph Nodes  Normal  Skin    Normal  Neurological   Normal   Tremor present NCS slight tremor 1/+1    If present, evaluate severity on 1-10 scale    LUISITO Ruelas CNP

## 2021-06-28 NOTE — PROGRESS NOTES
Progress Notes by Rossy Martin at 12/23/2019  9:48 AM     Author: Rossy Martin Service: -- Author Type: Patient Access    Filed: 12/23/2019  9:48 AM Encounter Date: 12/23/2019 Status: Signed    : Rossy Martin (Patient Access)         Zestar Study Device Return    Jennifer Wall returned all the devices for the Zestar study.  Jennifer Wall denies medication changes or adverse events since last visit.    Jennifer Wall has now completed their participation in the Zestar study.   Thank you for your gift of participation.    Rossy Martin

## 2021-06-28 NOTE — PROGRESS NOTES
Progress Notes by Tom Perdue at 12/19/2019 12:30 PM     Author: Tom Perdue Service: -- Author Type: Patient Access    Filed: 1/15/2020  3:43 PM Encounter Date: 12/19/2019 Status: Addendum    : Tom Perdue (Patient Access)    Related Notes: Original Note by Tom Perdue (Patient Access) filed at 12/19/2019  4:02 PM            Zestar Study Consent Visit    Study description: ECG and PPG Study: Zestar Study      Note time seated: 1249     Jennifer Wall a 74 y.o. female , was seen in 2500 today to discuss participation in the Zestar study.   The consent discussion began on 12/17/19.  Please refer to phone call note from Ayla Marshall for more details.  The consent form was reviewed with the patient and Edvin Wall.     The review of the study included:    Study purpose     Conflict of interest    Device description      Study visits    Risks of participation    Benefits (if any)    Alternatives    Voluntary participation    Confidentiality     Compensation/costs of participation    Study stipends    Injury and legal rights    The subject was provided time to review the consent form and consider participation. her questions were answered to her satisfaction.   The patient has voluntarily agreed to participate in the above noted study.     The consent form version 25 Nov. 2019 and HIPAA form version 11 June 2019 was signed 12/19/19 at 1314    The subject was provided with a copy of the consent form and HIPAA. A copy of the signed forms was forwarded to medical records.    No study procedures were done prior to Jennifer Wall providing informed consent.     Tom Perdue    Subject Restrictions During Study -Confirmed with Subject prior to any study procedures completed    Restrictions on jewelry, recreational drugs, caffeine, and exercise few days prior and during study.   1. Subjects should not consume excessive amount of caffeine (6 or more 8-oz cups of coffee, or more than 570 mg of  "caffeine from energy drinks, pills or similar substance) during their participation in the study.   2. Subjects should not consume excessive amount of alcohol for the duration of their participation in the study. A typical moderate amount is allowed during stage 3.   3. Subjects should not take any recreational drugs (including, but not limited to methamphetamines, cocaine, opioids, cannabis, LSD) for the duration of their participation in the study.   4. Subjects should not wear underwire bra or jewelry during the in-lab study (to not interfere with electrode placement and ECG data recordings).   5. Subject will not be permitted to have their cell phone or any electronic recording device on or with them during the in-lab test session(s).   6. Subjects under 22 years old will not be permitted to take ECG recordings through the ECG jermaine on the wrist-worn devices.     For study stage 3 only   1. Subjects should only do high intensity exercise (e.g. sprinting, heavy lifting, etc.) in the morning upon awakening or else not at all   2. Subjects should abstain from swimming during the time of the study   3. Subjects should only shower in the morning upon awakening (or else not at all)   4. Female subjects are strongly suggested to wear non-underwire bras throughout this stage of the study     Tom CARRI Perdue      Study Data collections   Vitals  (TPBP)     Vitals:    12/19/19 1320 12/19/19 1321 12/19/19 1322   BP: 134/87 135/81 127/78   Patient Site: Right Arm Right Arm Right Arm   Patient Position: Sitting Sitting Sitting   Cuff Size: Adult Regular Adult Regular Adult Regular   Pulse: 83 71 79   Resp:   20   Temp:  98  F (36.7  C)    TempSrc:  Oral    Weight:   139 lb (63 kg)   Height:   5' 8\" (1.727 m)      VS taken after 5 min rest     MAP 1    102  MAP 2      98   MAP 3             95          Body mass index is 21.13 kg/m .  female  1945  74 y.o.      Note time patient placed in supine position: " 1328    Ethnicity   []   or    [x]  Not  or   Race    []   or    []    []  Black or   []   or Other   [x]  White  Physical Activity Level  per subject report:    []  0- Extremely Inactive []  1- Sedentary [x]  2- Moderately Active  []  3- Vigorously Active []  4- Extremely Active  Trained Athlete    [] Yes  [x] No     Whitmore's' Skin type   [] Type 1 [x] Type 2 [] Type 3    [] Type 4 [] Type 5 [] Type 6    Subject participated in previous ECG study at [facility]: [] Yes    [x] No     Past Medical History:   Diagnosis Date   ? Atrial fibrillation (H) 2008   ? Heart murmur    ? History of cardioversion 2009   ? Hypertension 2008   ? Sleep apnea 1999    previously used CPAP. Lost weight and no longer uses CPAP or has sleep apnea   ? Stroke (H) 2008       HISTORY OF HEART RHYTHM ABNORMALITIES (check only group subject is 'randomized[' to-only 1)  []  Group 1: History of paroxysmal atrial fibrillation (no excluded medications)  []  Group 2: History of paroxysmal atrial fibrillation (on excluded medications)    []  Group 3: History of high-rate atrial fibrillation   []  Group 4: History of atrial fibrillation with rate control medications    [x]  Group 5: Permanent/persistent atrial fibrillation    []  Group 6: history of atrial flutter  []  Group 7: Frequent PVCs  []  Group 8: Frequent PACs  []  Group 9: BBB (left or right)  []  Group 10: History of 2nd Heart Block (any type)  []  Group 11: History of bigeminy, trigeminy, and/or quadgeminy  []  Group 12: History of tachycardia     Special interest allergies: active allergic skin reactions  Allergies   Allergen Reactions   ? Aspirin (Tartrazine Only)    ? Azithromycin    ? Latex    ? Morphine    ? Nitrofuran Analogues    ? Penicillins    ? Sulfa (Sulfonamide Antibiotics)        Current Outpatient Medications:   ?  alendronate (FOSAMAX) 70 MG tablet, Take 70 mg  "by mouth daily., Disp: , Rfl:   ?  atenolol (TENORMIN) 25 MG tablet, TAKE ONE TABLET BY MOUTH EVERY DAY, Disp: 90 tablet, Rfl: 3  ?  atorvastatin (LIPITOR) 10 MG tablet, TAKE ONE TABLET BY MOUTH ONCE DAILY, Disp: 90 tablet, Rfl: 1  ?  Ca-D3-mag#11-zinc-cupr-man-bor (CALTRATE 600+D PLUS MINERALS) 600 mg calcium- 800 unit-50 mg Tab, Take 1 tablet by mouth 2 (two) times a day., Disp: , Rfl:   ?  cholecalciferol, vitamin D3, 1,000 unit tablet, Take 1,000 Units by mouth daily., Disp: , Rfl:   ?  clindamycin (CLEOCIN) 150 MG capsule, Take 150 mg by mouth daily., Disp: , Rfl:   ?  diphenoxylate-atropine (LOMOTIL) 2.5-0.025 mg per tablet, Take 1-2 tablets by mouth 3 (three) times a day as needed for diarrhea., Disp: , Rfl:   ?  fluticasone (FLONASE) 50 mcg/actuation nasal spray, 2 sprays into each nostril daily., Disp: , Rfl:   ?  lisinopril-hydrochlorothiazide (PRINZIDE,ZESTORETIC) 20-12.5 mg per tablet, Take 1 tablet by mouth daily., Disp: , Rfl: 0  ?  multivitamin (ONE A DAY) per tablet, , Disp: , Rfl:   ?  potassium chloride (K-DUR,KLOR-CON) 20 MEQ tablet, Take 20 mEq by mouth daily., Disp: , Rfl:   ?  timolol maleate (TIMOPTIC) 0.5 % ophthalmic solution, Administer 1 drop to the right eye daily., Disp: , Rfl:   ?  warfarin (COUMADIN) 5 MG tablet, Take 1 tablet daily (Patient taking differently: Take 2.5 mg daily on Tue and Sat Take 3.75 mg tablets on Sun, Mon, Wed, Thur and Fri Based on INR results.), Disp: 90 tablet, Rfl: 3      10-sec 12-lead ECG & 30-sec 12-lead ECG rhythm strip done; reviewed by & PE done by Joann aHtch  Subject Questionnaire    OCCUPATION: Retired    Predominately works outdoors  [] Yes    [x] No      Hours/week spent outdoors (total, not only for work): 3     Frequently participates in \"hand intensive\" activities [] Yes [x] No    Caffeine  [x]  Never  [] Occasionally        []  Daily (1 cup/day)     []  Daily (>1 cup/day)    Alcohol    [x]  Never  [] Light (drink or 2 occasionally) []  Moderate " "(a drink or 2 almost daily)   []  Occasional-heavy (more than a few drinks <2x / month)  [] Heavy (more than a few drinks >2x / month)    Tobacco/nicotine   [x]  Never  [] Rarely  []  Frequently/ Daily     Mattress Information      Mattress type:  []  Memory foam [] Gel  [x] Innerspring (coil)  [] Airbed  [] Waterbed [] Shikibuton  [] Hybrid  [] No mattress  [] Other (comment):    Mattress foundation   [] Mattress on floor/ground    [] Mattress on foundation/box spring on floor/ground  [x] Mattress on foundation/box spring on bed frame  [] Mattress on tatami on floor/ground  [] Other (comment):    Mattress topper   [x] No mattress topper  [] Pillow top  [] Foam top - flat style  [] Foam top - \"egg crate\" style  [] Other (comment):    Co-sleeper    []  Yes  [x]  No    CPAP use   [] Yes  [x] No      Dominant hand [] left  [x] right [] ambidextrous  Preferred Wrist to wear band on   [x]  left   []  right   Were screening day & study day: [x]  same [] different   Same: wrist circumference:      142   mm  Device wearing wrist skin fold thickness:       1.8  mm  Wrist Band Size:     [x]  Flush Fit S/M  []  Non-Flush Fit S/M   []  Flush Fit M/L  []  Non-Flush Fit M/L  []  Flush Fit XL  []  Non-Flush Fit XL    Preferred/natural band notch: 2  Secure band notch: 2  Crown orientation:  []  left   [x]  right  Device wearing wrist hairiness:     [x]  Light []  Medium       []  Heavy  Spectophotometer   L A B   Reading #1  64.95  6.91  18.03   Reading #2  65.16  6.26  17.30   Reading #3  67.23  7.37  18.51     Pregnancy test    [] WOCBP (age <55 yrs, no tubal ligation, no hysterectomy)    [x] n/a male or female not child bearing potential  For Stage 2: subject will use exercise bike for exercise portion of the study  Room Temperature: 24 C  CS Laptop ID: [id number]  CS Cam ID:[id number]  Device Set ID:[id number]  Wrist Device ID:[id number]  Subject has now completed their in-house participation in the Zestar study. Subject will complete Stage 3 at home " for the next 3 days and return the equipment on 12/23/19 .  Tom Perdue

## 2021-06-28 NOTE — PROGRESS NOTES
Progress Notes by Tom Perdue at 12/19/2019 12:30 PM     Author: Tom Perdue Service: -- Author Type: Patient Access    Filed: 12/19/2019  6:17 PM Encounter Date: 12/19/2019 Status: Signed    : Ean White MD (Physician)    Related Notes: Original Note by Tom Perdue (Patient Access) filed at 12/19/2019  4:02 PM           Zestar Study Inclusion / Exclusion Criteria  Protocol Version 4.0 ([id number])    Inclusion Criteria    Yes No Criteria # Subject must meet all inclusion criteria:      [x]    []  1 At least 18 years old for NSR and 22 years old for Non-NSR. Inclusive for both cohorts, at time of screening, with no upper limit on age.        [x]      []  2 To be enrolled as a non-NSR subject the volunteer must have one of the following conditions: Permanent/Persistent AF, Hx of paroxysmal AF, Hx of High-rate AF, AF + rate control medication, Hx Atrial Flutter, PVC burden >1%, Frequent PACs, Hx BBB, Hx 2nd degree block (any type), Hx Bigeminy/Trigeminy/Quadgeminy, Hx Tachycardia. For subjects with any of the following diagnoses, the condition must be present at the time of screening:   ? Permanent/persistent AF  ? PVC [place]  ? Frequent PACs   Note: If not present at screening, subjects may not be enrolled as a non-NSR subject or NSR subject.         3  [x] N/A HE site For Subjects to be enrolled as NSR they must be in NSR at the time of screening as determined by the investigator. For subjects ?65 years old with PVC burden of ?1% or infrequent PACs (<3 ectopic beats in 30 seconds), they may qualify as individuals in the NSR cohort as long as they don't have a medical history/diagnosis of significant ectopic burden. For subjects ? 66 years old with PVC burden > 1% but ?10%, they may qualify as individuals in the NSR cohort as long as they don't have a medical history/diagnosis of significant ectopic burden.    [x]  []  4 Able to read and understand a written ICF    [x]  []  5 Willing and  able to participate in the study procedures and comply with its restrictions    [x]  []  6 Able to communicate effectively with study staff as well as understand and follow directions    All must be Yes    Exclusion Criteria-all must be no  Yes No Criteria # Subject must not meet any exclusion criteria:    []  [x]  1 Physical disability that prevents safe and adequate testing.   []  [x]  2 Pregnant women or women planning to become pregnant   []  [x]  3 Any acute illness or condition that may interfere with study procedures (e.g. cough, fever, sore throat, headache, sunburn, etc.)   []  [x]  4 Clinically significant hand tremors, as judged by the Investigator   []  [x]  5 Resting hypertension with systolic blood pressure ?161 mmHg or diastolic blood pressure ?101 mmHg (if at least 2 of 3 measurements meet this criteria)   []  [x]  6 Subjects with a pacemaker or an automated implantable cardioverter- defibrillator (AICD)   []  [x]  7 Acute myocardial infarction (MI) within 90 days from the screening visit   []  [x]  8 Other cardiovascular disease that increases the risk to the subject or would render the data uninterpretable in the opinion of the Investigator (e.g., recent or ongoing unstable angina, significant valvular heart disease or chronic heart failure, myocarditis or pericarditis)    []  [x]  9 Acute pulmonary embolism, pulmonary infarction, or deep vein thrombosis within 90 days from the screening visit    []  [x]  10 Stroke or transient ischemic attack within 90 days from the screening visit    []  [x]  11 Known untreated medical conditions as determined by the Investigator, such as but not limited to significant anemia, important electrolyte imbalance and untreated or uncontrolled thyroid disease.    []  [x]  12 Any history of wrist surgery with scarring in the area of the sensor location on the wrist where the subject will be wearing the watch;    []  [x]  13 Open wound(s) on the wrist and forearm where  the subject will be wearing the watch    []  [x]  14 Severe symptomatic (or active) overly dry/injured skin, skin disorders, or allergic skin reactions such as eczema, rosacea, impetigo, dermatomyositis or allergic contact dermatitis on wrist and locations where the electrodes will be placed (e.g. chest, forearms, stomach), as determined by the investigator.    []  [x]  15 Tattoos, scars or moles in the area of the sensor location on the wrist where the subject will be wearing the watch    []  [x]  16 Device wearing Wrist circumference ? 129 mm or ? 246 mm    []  [x]  17 Known significant sensitivity to medical adhesives or isopropyl alcohol (for ECG electrode placement)    []  [x]  18 Known allergy or sensitivity to fluorocarbon-based synthetic rubber, such as contact dermatitis with fluoroelastomer bands primarily used in wrist worn fitness devices    []  [x]  19 Subjects with any Medical History, Physical exam, vital sign or any other study procedure finding/assessment that in the opinion of the investigator could compromise subject safety during study participation or interfere with the study integrity and/or the accurate assessment of the study objectives    []  [x]  20 Subject works for a company that develops or sells medical and/or fitness devices (e.g., ECG monitors, wearable fitness bands, sleep monitors, etc.) or are technology journalists (e.g., professional bloggers, TV, magazine, newspaper reporters, etc.)    []  [x]  21 Weight > 181 kg for subjects using the stationary bike and/or treadmill. Weight of ?138 kg for NSR subjects.    []  [x]  22 Subject is employed in shift work, or otherwise does not maintain a reasonably consistent day/night schedule (e.g. Subjects who go to bed after 4am).    []  [x]  23 Overnight travel planned during data collection nights    []  [x]  24 Non-NSR subjects should not have partaken in strenuous physical activity within 12 hours prior to screening    []  [x]  25 Non-NSR  subjects with Atrial fibrillation categories: Subjects taking Class 1 or Class 3 antiarrhythmic agents such as the following may not take part in any stage of the study: amiodarone, sotalol, dronedarone, ibutilide, dofetilide, propafenone, quinidine, procainamide, disopyramide, flecainide (Subjects taking class 2, 4 or 5 antiarrhythmic agents may take part the study).    []  [x]  26 Subjects who have both a history of paroxysmal AF and a Whitmore skin type measurement of VI    []  [x]  27 Subjects who have missing index fingers on both hands      Subject has met all inclusion criteria and no exclusion criteria have been met.   Subject is ready to fully enrolled in the Zestar study.    Tom Perdue

## 2021-06-28 NOTE — PROGRESS NOTES
Progress Notes by Yolis Pereyra MD at 9/13/2019  4:10 PM     Author: Yolis Pereyra MD Service: -- Author Type: Physician    Filed: 9/13/2019  4:37 PM Encounter Date: 9/13/2019 Status: Signed    : Yolis Pereyra MD (Physician)           Click to link to [facility] HEART CARE NOTE    Thank you, Dr. Barba, for asking the [facility] team to see Ms. Jennifer Wall to follow-up on her atrial fibrillation as well as pulmonary venous hypertension.    Assessment/Recommendations   Assessment:    1.  Chronic atrial fibrillation, rate well controlled.  She reports no symptoms of lightheadedness or near syncope but does describe some occasional unsteadiness on her feet.  She does have some tendency for slow heart rates but at this point I would favor continuation of her current dose of atenolol.  If anything, we could have her check with her ophthalmologist about an alternative for the timolol eyedrops.  She does remain on warfarin anticoagulation to minimize risks of thromboembolic events.  2.  Weight loss, etiology unclear.  Her weight is actually stable when compared to her visit 1 year ago although she had lost over 20 pounds at that time.  She does admit to significant amount of stress but I encouraged her to follow-up with her primary physician for any additional evaluation.  3.  Pulmonary hypertension, likely venous in nature.  Suggested an echocardiogram to follow-up on this as well as her aortic valve sclerosis.  Her  question whether her blood pressure medication could be contributing to frequent urination at night.  I told him it was possible but if we stopped the diuretic, she would have more lower extremity edema.  Based on that, they are content to stay on her current medications.  4.  Insomnia.  I suggested a trial of melatonin or Benadryl    Plan:  1.  Continue current medications  2.  Echocardiogram to follow-up on her aortic valve disease and pulmonary venous  hypertension with further recommendations to follow  3.  Tentative follow-up in 1 year       History of Present Illness    Ms. Jennifer Wall is a 74 y.o. female with history of previous ischemic stroke, chronic atrial fibrillation and essential hypertension who returns to the office today for her yearly evaluation.  I saw her  earlier today who is quite concerned that she was continuing to lose weight.  Since she was due for an appointment and I had an opening, she is back to the office with her  this afternoon.  Her weight is actually stable when compared to her visit 1 year ago.  She reports occasional sense of rapid or forceful heart beating although this appears to be associated with emotional stress.  Outside of the emotional stress, she is not aware of any palpitations or any discomfort in her chest.  She denies orthopnea or PND.  Her lower extremity edema has remained stable.  She does report difficulty falling asleep but has not tried melatonin.  She also reports getting up 3 times at night to urinate and her  question whether it may be related to her blood pressure pill.  I told him it does have a diuretic although if we stop it, because her ankles to swell more.  At this point, recommended we hold off on any med changes until the echocardiogram is completed.    ECG (personally reviewed): No ECG today    Cardiac Imaging Studies (personally reviewed): No recent cardiac imaging     Physical Examination Review of Systems   Vitals:    09/13/19 1557   BP: 110/82   Pulse: (!) 54   Resp: 10     Body mass index is 22.87 kg/m .  Wt Readings from Last 3 Encounters:   09/13/19 146 lb (66.2 kg)   09/07/18 145 lb (65.8 kg)   08/29/17 168 lb (76.2 kg)     General Appearance:   Awake, Alert, No acute distress.   HEENT:  No scleral icterus; the mucous membranes were pink and moist.   Neck: No cervical bruits or jugular venous distention    Chest: The spine was straight. The chest was symmetric.    Lungs:   Respirations unlabored; the lungs are clear to auscultation. No wheezing   Cardiovascular:    Irregularly irregular rhythm.  S1 normal, S2 reduced.  A 2/6 mid peaking crescendo decrescendo systolic murmur is heard at the left upper sternal border radiating to the apex.  No diastolic component.   Abdomen:  No organomegaly, masses, bruits, or tenderness. Bowels sounds are present   Extremities:  Chronic venous stasis changes in both lower extremities.  There is 1+ pitting edema both ankles.   Skin: No xanthelasma. Warm, Dry.   Musculoskeletal: No tenderness.   Neurologic: Mood and affect are appropriate.    General: Weight Loss  Eyes: WNL  Ears/Nose/Throat: WNL  Lungs: WNL  Heart: WNL  Stomach: WNL  Bladder: WNL  Muscle/Joints: WNL  Skin: WNL  Nervous System: WNL  Mental Health: WNL     Blood: WNL     Medical History  Surgical History Family History Social History   Past Medical History:   Diagnosis Date   ? Atrial fibrillation (H)    ? Heart murmur    ? Hypertension    ? Stroke (H)     Past Surgical History:   Procedure Laterality Date   ? HYSTERECTOMY      Family History   Problem Relation Age of Onset   ? No Medical Problems Mother    ? No Medical Problems Father    ? No Medical Problems Sister    ? No Medical Problems Daughter    ? No Medical Problems Maternal Grandmother    ? No Medical Problems Maternal Grandfather    ? No Medical Problems Paternal Grandmother    ? No Medical Problems Paternal Grandfather    ? No Medical Problems Maternal Aunt    ? No Medical Problems Paternal Aunt    ? BRCA 1/2 Neg Hx    ? Breast cancer Neg Hx    ? Cancer Neg Hx    ? Colon cancer Neg Hx    ? Endometrial cancer Neg Hx    ? Ovarian cancer Neg Hx     Social History     Socioeconomic History   ? Marital status:      Spouse name: Not on file   ? Number of children: Not on file   ? Years of education: Not on file   ? Highest education level: Not on file   Occupational History   ? Not on file   Social Needs   ?  Financial resource strain: Not on file   ? Food insecurity:     Worry: Not on file     Inability: Not on file   ? Transportation needs:     Medical: Not on file     Non-medical: Not on file   Tobacco Use   ? Smoking status: Never Smoker   ? Smokeless tobacco: Never Used   Substance and Sexual Activity   ? Alcohol use: Not on file   ? Drug use: Not on file   ? Sexual activity: Not on file   Lifestyle   ? Physical activity:     Days per week: Not on file     Minutes per session: Not on file   ? Stress: Not on file   Relationships   ? Social connections:     Talks on phone: Not on file     Gets together: Not on file     Attends Restorationist service: Not on file     Active member of club or organization: Not on file     Attends meetings of clubs or organizations: Not on file     Relationship status: Not on file   ? Intimate partner violence:     Fear of current or ex partner: Not on file     Emotionally abused: Not on file     Physically abused: Not on file     Forced sexual activity: Not on file   Other Topics Concern   ? Not on file   Social History Narrative   ? Not on file          Medications  Allergies   Current Outpatient Medications   Medication Sig Dispense Refill   ? alendronate (FOSAMAX) 70 MG tablet Take 70 mg by mouth daily.     ? atenolol (TENORMIN) 25 MG tablet TAKE ONE TABLET BY MOUTH EVERY DAY 90 tablet 3   ? atorvastatin (LIPITOR) 10 MG tablet TAKE ONE TABLET BY MOUTH ONCE DAILY 90 tablet 1   ? Ca-D3-mag#11-zinc-cupr-man-bor (CALTRATE 600+D PLUS MINERALS) 600 mg calcium- 800 unit-50 mg Tab Take 1 tablet by mouth 2 (two) times a day.     ? cholecalciferol, vitamin D3, 1,000 unit tablet Take 1,000 Units by mouth daily.     ? clindamycin (CLEOCIN) 150 MG capsule Take 150 mg by mouth daily.     ? diphenoxylate-atropine (LOMOTIL) 2.5-0.025 mg per tablet Take 1-2 tablets by mouth 3 (three) times a day as needed for diarrhea.     ? fluticasone (FLONASE) 50 mcg/actuation nasal spray 2 sprays into each nostril  daily.     ? lisinopril-hydrochlorothiazide (ZESTORETIC) 20-12.5 mg per tablet Take 1 tablet by mouth daily. 30 tablet 1   ? multivitamin (ONE A DAY) per tablet      ? potassium chloride (K-DUR,KLOR-CON) 20 MEQ tablet Take 20 mEq by mouth daily.     ? timolol maleate (TIMOPTIC) 0.5 % ophthalmic solution Administer 1 drop to the right eye daily.     ? warfarin (COUMADIN) 5 MG tablet Take 1 tablet daily 90 tablet 3     No current facility-administered medications for this visit.       Allergies   Allergen Reactions   ? Aspirin (Tartrazine Only)    ? Azithromycin    ? Latex    ? Morphine    ? Nitrofuran Analogues    ? Penicillins    ? Sulfa (Sulfonamide Antibiotics)          Lab Results    Chemistry/lipid CBC Cardiac Enzymes/BNP/TSH/INR   Lab Results   Component Value Date    CHOL 149 05/04/2015    HDL 68 05/04/2015    LDLCALC 67 05/04/2015    TRIG 71 05/04/2015    CREATININE 0.80 09/11/2017    BUN 17 09/11/2017    K 4.2 09/11/2017     09/11/2017     09/11/2017    CO2 27 09/11/2017    Lab Results   Component Value Date    WBC 4.4 05/04/2015    HGB 13.6 05/04/2015    HCT 40.0 05/04/2015    MCV 96 05/04/2015     05/04/2015    Lab Results   Component Value Date    TSH 1.1 03/12/2013    INR 2.0 (!) 06/23/2016

## 2021-06-29 NOTE — PROGRESS NOTES
Progress Notes by Yolis Pereyra MD at 10/30/2020  8:10 AM     Author: Yolis Pereyra MD Service: -- Author Type: Physician    Filed: 10/30/2020  8:57 AM Encounter Date: 10/30/2020 Status: Signed    : Yolis Pereyra MD (Physician)           Thank you, Dr. Hubbard, for asking the [facility] team to see Ms. Jennifer Wall to follow-up on chronic atrial fibrillation and mild pulmonary venous hypertension.    Assessment/Recommendations   Assessment:    1.  Chronic atrial fibrillation, rate controlled.  She has had intermittent evidence of bradycardia with somewhat slow heart rates again noted today.  Denies any lightheadedness or near syncope.  I did recommend checking a Holter monitor to see if her heart rates are predominantly bradycardic.  If so, we will decrease her dose of atenolol.  She does remain on warfarin anticoagulation to minimize risk of thromboembolic events and has had no bleeding issues.  2.  Aortic valve sclerosis documented on previous echocardiogram.  Again we will plan an echo in the next week or 2 to reassess her valve and follow-up on her pulmonary hypertension  3.  Pulmonary hypertension, mild by echocardiogram 1 year ago.  Again we will follow up on echo.  Does continue to have some intermittent lower extremity edema although none evident today.  Continue low-dose hydrochlorothiazide.      Plan:  1.  Continue current medications for now  2.  Obtain echo and Holter monitor with further recommendations to follow  3.  Tentative follow-up in 1 year       History of Present Illness    Ms. Jennifer Wall is a 75 y.o. female with history of chronic atrial fibrillation, aortic valve sclerosis, mild pulmonary venous hypertension and lower extremity edema who presents today for her yearly visit.  She reports no symptoms of exertional dyspnea or chest discomfort.  Denies orthopnea or PND.  Does continue to have intermittent lower extremity edema although none evident today.  Denies  any symptoms of lightheadedness or near syncope but does report occasional episodes of pounding in her chest.  Has had some evidence of intermittent bradycardia over the year so recommended a Holter to follow-up on that.    ECG (personally reviewed): No ECG today    Cardiac Imaging Studies (personally reviewed): No recent imaging     Physical Examination Review of Systems   Vitals:    10/30/20 0814   BP: 136/76   Pulse: 64   Resp: 16     Body mass index is 20.98 kg/m .  Wt Readings from Last 3 Encounters:   10/30/20 138 lb (62.6 kg)   12/19/19 139 lb (63 kg)   09/20/19 146 lb (66.2 kg)     General Appearance:   Awake, Alert, No acute distress.   HEENT:  No scleral icterus; the mucous membranes were pink and moist.   Neck: No cervical bruits or jugular venous distention    Chest: The spine was straight. The chest was symmetric.   Lungs:   Respirations unlabored; the lungs are clear to auscultation. No wheezing   Cardiovascular:    Irregularly irregular rhythm which is transiently bradycardic.  S1 normal, S2 reduced.  2/6 early peaking systolic ejection type murmur heard best at the left upper sternal border radiating to the apex.  No diastolic component.   Abdomen:  No organomegaly, masses, bruits, or tenderness. Bowels sounds are present   Extremities:  No peripheral edema, clubbing or cyanosis.   Skin: No xanthelasma. Warm, Dry.   Musculoskeletal: No tenderness.   Neurologic: Mood and affect are appropriate.    General: Weight Loss  Eyes: WNL  Ears/Nose/Throat: Hearing Loss  Lungs: WNL  Heart: Irregular Heartbeat, Leg Swelling  Stomach: Constipation  Bladder: WNL  Muscle/Joints: WNL  Skin: WNL  Nervous System: Loss of Balance  Mental Health: WNL     Blood: WNL     Medical History  Surgical History Family History Social History   Past Medical History:   Diagnosis Date   ? Atrial fibrillation (H) 2008   ? Heart murmur    ? History of cardioversion 2009   ? Hypertension 2008   ? Sleep apnea 1999    previously used  CPAP. Lost weight and no longer uses CPAP or has sleep apnea   ? Stroke (H) 2008    Past Surgical History:   Procedure Laterality Date   ? HYSTERECTOMY      Family History   Problem Relation Age of Onset   ? No Medical Problems Mother    ? No Medical Problems Father    ? No Medical Problems Sister    ? No Medical Problems Daughter    ? No Medical Problems Maternal Grandmother    ? No Medical Problems Maternal Grandfather    ? No Medical Problems Paternal Grandmother    ? No Medical Problems Paternal Grandfather    ? No Medical Problems Maternal Aunt    ? No Medical Problems Paternal Aunt    ? BRCA 1/2 Neg Hx    ? Breast cancer Neg Hx    ? Cancer Neg Hx    ? Colon cancer Neg Hx    ? Endometrial cancer Neg Hx    ? Ovarian cancer Neg Hx     Social History     Socioeconomic History   ? Marital status:      Spouse name: Not on file   ? Number of children: Not on file   ? Years of education: Not on file   ? Highest education level: Not on file   Occupational History   ? Not on file   Social Needs   ? Financial resource strain: Not on file   ? Food insecurity     Worry: Not on file     Inability: Not on file   ? Transportation needs     Medical: Not on file     Non-medical: Not on file   Tobacco Use   ? Smoking status: Never Smoker   ? Smokeless tobacco: Never Used   Substance and Sexual Activity   ? Alcohol use: Not on file   ? Drug use: Not on file   ? Sexual activity: Not on file   Lifestyle   ? Physical activity     Days per week: Not on file     Minutes per session: Not on file   ? Stress: Not on file   Relationships   ? Social connections     Talks on phone: Not on file     Gets together: Not on file     Attends Yazdanism service: Not on file     Active member of club or organization: Not on file     Attends meetings of clubs or organizations: Not on file     Relationship status: Not on file   ? Intimate partner violence     Fear of current or ex partner: Not on file     Emotionally abused: Not on file      Physically abused: Not on file     Forced sexual activity: Not on file   Other Topics Concern   ? Not on file   Social History Narrative   ? Not on file          Medications  Allergies   Current Outpatient Medications   Medication Sig Dispense Refill   ? alendronate (FOSAMAX) 70 MG tablet Take 70 mg by mouth daily.     ? atenolol (TENORMIN) 25 MG tablet TAKE ONE TABLET BY MOUTH EVERY DAY 90 tablet 3   ? atorvastatin (LIPITOR) 10 MG tablet TAKE ONE TABLET BY MOUTH ONCE DAILY 90 tablet 1   ? Ca-D3-mag#11-zinc-cupr-man-bor (CALTRATE 600+D PLUS MINERALS) 600 mg calcium- 800 unit-50 mg Tab Take 1 tablet by mouth 2 (two) times a day.     ? cholecalciferol, vitamin D3, 1,000 unit tablet Take 1,000 Units by mouth daily.     ? clindamycin (CLEOCIN) 150 MG capsule Take 150 mg by mouth daily.     ? lisinopril-hydrochlorothiazide (PRINZIDE,ZESTORETIC) 20-12.5 mg per tablet Take 1 tablet by mouth daily.  0   ? multivitamin (ONE A DAY) per tablet      ? potassium chloride (K-DUR,KLOR-CON) 20 MEQ tablet Take 20 mEq by mouth daily.     ? timolol maleate (TIMOPTIC) 0.5 % ophthalmic solution Administer 1 drop to the right eye daily.     ? warfarin (COUMADIN) 5 MG tablet Take 1 tablet daily (Patient taking differently: Take 2.5 mg daily on Tue and Sat  Take 3.75 mg tablets on Sun, Mon, Wed, Thur and Fri  Based on INR results.) 90 tablet 3   ? diphenoxylate-atropine (LOMOTIL) 2.5-0.025 mg per tablet Take 1-2 tablets by mouth 3 (three) times a day as needed for diarrhea.     ? fluticasone (FLONASE) 50 mcg/actuation nasal spray 2 sprays into each nostril daily.       No current facility-administered medications for this visit.       Allergies   Allergen Reactions   ? Aspirin (Tartrazine Only)    ? Azithromycin    ? Latex    ? Morphine    ? Nitrofuran Analogues    ? Penicillins    ? Sulfa (Sulfonamide Antibiotics)          Lab Results    Chemistry/lipid CBC Cardiac Enzymes/BNP/TSH/INR   Lab Results   Component Value Date    CHOL 149  05/04/2015    HDL 68 05/04/2015    LDLCALC 67 05/04/2015    TRIG 71 05/04/2015    CREATININE 0.80 09/11/2017    BUN 17 09/11/2017    K 4.2 09/11/2017     09/11/2017     09/11/2017    CO2 27 09/11/2017    Lab Results   Component Value Date    WBC 4.4 05/04/2015    HGB 13.6 05/04/2015    HCT 40.0 05/04/2015    MCV 96 05/04/2015     05/04/2015    Lab Results   Component Value Date    TSH 1.1 03/12/2013    INR 2.0 (!) 06/23/2016

## 2021-07-13 ENCOUNTER — RECORDS - HEALTHEAST (OUTPATIENT)
Dept: ADMINISTRATIVE | Facility: CLINIC | Age: 76
End: 2021-07-13

## 2021-07-21 ENCOUNTER — RECORDS - HEALTHEAST (OUTPATIENT)
Dept: ADMINISTRATIVE | Facility: CLINIC | Age: 76
End: 2021-07-21

## 2021-07-22 ENCOUNTER — RECORDS - HEALTHEAST (OUTPATIENT)
Dept: FAMILY MEDICINE | Facility: CLINIC | Age: 76
End: 2021-07-22

## 2021-07-22 DIAGNOSIS — Z12.31 OTHER SCREENING MAMMOGRAM: ICD-10-CM

## 2021-09-03 ENCOUNTER — HOSPITAL ENCOUNTER (EMERGENCY)
Facility: HOSPITAL | Age: 76
Discharge: HOME | End: 2021-09-04
Attending: EMERGENCY MEDICINE | Admitting: EMERGENCY MEDICINE
Payer: MEDICARE

## 2021-09-03 ENCOUNTER — APPOINTMENT (OUTPATIENT)
Dept: CT IMAGING | Facility: HOSPITAL | Age: 76
End: 2021-09-03
Attending: EMERGENCY MEDICINE
Payer: MEDICARE

## 2021-09-03 DIAGNOSIS — M79.10 MUSCLE PAIN: ICD-10-CM

## 2021-09-03 DIAGNOSIS — K59.00 CONSTIPATION, UNSPECIFIED CONSTIPATION TYPE: ICD-10-CM

## 2021-09-03 PROBLEM — I10 HYPERTENSION: Status: ACTIVE | Noted: 2019-04-09

## 2021-09-03 PROBLEM — I35.8 AORTIC VALVE SCLEROSIS: Status: ACTIVE | Noted: 2019-04-09

## 2021-09-03 LAB
ALBUMIN SERPL-MCNC: 4.3 G/DL (ref 3.5–5)
ALP SERPL-CCNC: 54 U/L (ref 45–120)
ALT SERPL W P-5'-P-CCNC: 16 U/L (ref 0–45)
ANION GAP SERPL CALCULATED.3IONS-SCNC: 13 MMOL/L (ref 5–18)
AST SERPL W P-5'-P-CCNC: 30 U/L (ref 0–40)
BASOPHILS # BLD AUTO: 0.1 10E3/UL (ref 0–0.2)
BASOPHILS NFR BLD AUTO: 1 %
BILIRUB DIRECT SERPL-MCNC: 0.5 MG/DL
BILIRUB SERPL-MCNC: 1.6 MG/DL (ref 0–1)
BUN SERPL-MCNC: 21 MG/DL (ref 8–28)
CALCIUM SERPL-MCNC: 10 MG/DL (ref 8.5–10.5)
CHLORIDE BLD-SCNC: 100 MMOL/L (ref 98–107)
CO2 SERPL-SCNC: 25 MMOL/L (ref 22–31)
CREAT SERPL-MCNC: 0.93 MG/DL (ref 0.6–1.1)
EOSINOPHIL # BLD AUTO: 0.1 10E3/UL (ref 0–0.7)
EOSINOPHIL NFR BLD AUTO: 2 %
ERYTHROCYTE [DISTWIDTH] IN BLOOD BY AUTOMATED COUNT: 13.4 % (ref 10–15)
GFR SERPL CREATININE-BSD FRML MDRD: 59 ML/MIN/1.73M2
GLUCOSE BLD-MCNC: 149 MG/DL (ref 70–125)
HCT VFR BLD AUTO: 37.8 % (ref 35–47)
HGB BLD-MCNC: 12.2 G/DL (ref 11.7–15.7)
HOLD SPECIMEN: NORMAL
IMM GRANULOCYTES # BLD: 0 10E3/UL
IMM GRANULOCYTES NFR BLD: 0 %
INR PPP: 2.18 (ref 0.9–1.15)
LIPASE SERPL-CCNC: 53 U/L (ref 0–52)
LYMPHOCYTES # BLD AUTO: 1.2 10E3/UL (ref 0.8–5.3)
LYMPHOCYTES NFR BLD AUTO: 23 %
MCH RBC QN AUTO: 31.4 PG (ref 26.5–33)
MCHC RBC AUTO-ENTMCNC: 32.3 G/DL (ref 31.5–36.5)
MCV RBC AUTO: 97 FL (ref 78–100)
MONOCYTES # BLD AUTO: 0.5 10E3/UL (ref 0–1.3)
MONOCYTES NFR BLD AUTO: 10 %
NEUTROPHILS # BLD AUTO: 3.2 10E3/UL (ref 1.6–8.3)
NEUTROPHILS NFR BLD AUTO: 64 %
NRBC # BLD AUTO: 0 10E3/UL
NRBC BLD AUTO-RTO: 0 /100
PLATELET # BLD AUTO: 145 10E3/UL (ref 150–450)
POTASSIUM BLD-SCNC: 3.6 MMOL/L (ref 3.5–5)
PROT SERPL-MCNC: 7.8 G/DL (ref 6–8)
RBC # BLD AUTO: 3.88 10E6/UL (ref 3.8–5.2)
SODIUM SERPL-SCNC: 138 MMOL/L (ref 136–145)
TROPONIN I SERPL-MCNC: 0.02 NG/ML (ref 0–0.29)
WBC # BLD AUTO: 5.1 10E3/UL (ref 4–11)

## 2021-09-03 PROCEDURE — 85025 COMPLETE CBC W/AUTO DIFF WBC: CPT | Performed by: STUDENT IN AN ORGANIZED HEALTH CARE EDUCATION/TRAINING PROGRAM

## 2021-09-03 PROCEDURE — 99285 EMERGENCY DEPT VISIT HI MDM: CPT | Mod: 25

## 2021-09-03 PROCEDURE — 84484 ASSAY OF TROPONIN QUANT: CPT | Performed by: EMERGENCY MEDICINE

## 2021-09-03 PROCEDURE — 85025 COMPLETE CBC W/AUTO DIFF WBC: CPT | Performed by: EMERGENCY MEDICINE

## 2021-09-03 PROCEDURE — 85610 PROTHROMBIN TIME: CPT | Performed by: EMERGENCY MEDICINE

## 2021-09-03 PROCEDURE — 83690 ASSAY OF LIPASE: CPT | Performed by: EMERGENCY MEDICINE

## 2021-09-03 PROCEDURE — 250N000011 HC RX IP 250 OP 636: Performed by: EMERGENCY MEDICINE

## 2021-09-03 PROCEDURE — 93005 ELECTROCARDIOGRAM TRACING: CPT | Performed by: EMERGENCY MEDICINE

## 2021-09-03 PROCEDURE — 80053 COMPREHEN METABOLIC PANEL: CPT | Performed by: EMERGENCY MEDICINE

## 2021-09-03 PROCEDURE — 36415 COLL VENOUS BLD VENIPUNCTURE: CPT | Performed by: EMERGENCY MEDICINE

## 2021-09-03 PROCEDURE — 74177 CT ABD & PELVIS W/CONTRAST: CPT

## 2021-09-03 PROCEDURE — 93005 ELECTROCARDIOGRAM TRACING: CPT | Performed by: STUDENT IN AN ORGANIZED HEALTH CARE EDUCATION/TRAINING PROGRAM

## 2021-09-03 PROCEDURE — 82248 BILIRUBIN DIRECT: CPT | Performed by: EMERGENCY MEDICINE

## 2021-09-03 RX ORDER — DOCUSATE SODIUM 100 MG/1
100 CAPSULE, LIQUID FILLED ORAL 2 TIMES DAILY
Qty: 14 CAPSULE | Refills: 0 | Status: SHIPPED | OUTPATIENT
Start: 2021-09-03

## 2021-09-03 RX ORDER — POLYETHYLENE GLYCOL 3350 17 G/17G
1 POWDER, FOR SOLUTION ORAL DAILY
Qty: 578 G | Refills: 0 | Status: SHIPPED | OUTPATIENT
Start: 2021-09-03 | End: 2021-09-10

## 2021-09-03 RX ORDER — IOPAMIDOL 755 MG/ML
75 INJECTION, SOLUTION INTRAVASCULAR ONCE
Status: COMPLETED | OUTPATIENT
Start: 2021-09-03 | End: 2021-09-03

## 2021-09-03 RX ADMIN — IOPAMIDOL 75 ML: 755 INJECTION, SOLUTION INTRAVENOUS at 23:08

## 2021-09-03 ASSESSMENT — ENCOUNTER SYMPTOMS
DIZZINESS: 0
HEMATURIA: 0
CHILLS: 0
CONFUSION: 0
FEVER: 0
ABDOMINAL PAIN: 1
RECTAL PAIN: 0
ROS GI COMMENTS: NEGATIVE FOR MELENA.
BLOOD IN STOOL: 0
JOINT SWELLING: 0
DIAPHORESIS: 0
MYALGIAS: 1
SHORTNESS OF BREATH: 0
NAUSEA: 0
DIARRHEA: 0
SORE THROAT: 0
DYSURIA: 0
VOMITING: 0
CONSTIPATION: 1

## 2021-09-04 VITALS
RESPIRATION RATE: 16 BRPM | HEART RATE: 77 BPM | DIASTOLIC BLOOD PRESSURE: 81 MMHG | SYSTOLIC BLOOD PRESSURE: 129 MMHG | TEMPERATURE: 98 F | OXYGEN SATURATION: 100 %

## 2021-09-04 LAB
ATRIAL RATE - MUSE: 241 BPM
DIASTOLIC BLOOD PRESSURE - MUSE: NORMAL MMHG
INTERPRETATION ECG - MUSE: NORMAL
P AXIS - MUSE: NORMAL DEGREES
PR INTERVAL - MUSE: NORMAL MS
QRS DURATION - MUSE: 96 MS
QT - MUSE: 374 MS
QTC - MUSE: 457 MS
R AXIS - MUSE: 32 DEGREES
SYSTOLIC BLOOD PRESSURE - MUSE: NORMAL MMHG
T AXIS - MUSE: 3 DEGREES
TROPONIN I SERPL-MCNC: 0.02 NG/ML (ref 0–0.29)
VENTRICULAR RATE- MUSE: 90 BPM

## 2021-09-04 PROCEDURE — 84484 ASSAY OF TROPONIN QUANT: CPT | Performed by: EMERGENCY MEDICINE

## 2021-09-04 PROCEDURE — 36415 COLL VENOUS BLD VENIPUNCTURE: CPT | Performed by: EMERGENCY MEDICINE

## 2021-09-04 NOTE — ED TRIAGE NOTES
Pt having bilateral lower arm pain since this morning.  Son is playing football, they were watching the game and pt started to get nauseas.  Pt now having abd pain

## 2021-09-04 NOTE — ED PROVIDER NOTES
Emergency Department Encounter     Evaluation Date & Time:   9/3/2021 10:34 PM    CHIEF COMPLAINT:  Arm Pain and Nausea      Triage Note:Pt having bilateral lower arm pain since this morning.  Son is playing football, they were watching the game and pt started to get nauseas.  Pt now having abd pain        ED COURSE & MEDICAL DECISION MAKING:     ED Course as of Sep 04 0058   Fri Sep 03, 2021   2335 Initial trop negative. Repeat delta trop ordered for atypical symptoms with b/l forearm pain and no other associated symptoms.  If negative, will discharge.    INR therapeutic.    CT abd/pelvis showing constipation, otherwise negative.        10:44 PM I met with the patient for the initial interview and physical examination. Discussed plan for treatment and workup in the ED. Pt here with b/l forearm pain around 1900 while watching football game tonight outdoors. Reports abdominal pain/discomfort associated with it as well. Pt does report long history of constipation and no BM today.  No nausea/vomiting, contrary to triage note. Pt has no cp/sob, but b/l forearm discomfort a little concerning, so she needs cardiac workup.  Pt does have chronic Afib, on coumadin.  EKG non-ischemic/unchanged here. Will get labs, CT abd/pelvis, reassess.      11:38 PM I rechecked and updated the patient.  She is doing well here. Discussed CT results, treatment with colace/miralax, continued good water intake and metamucil.  Pt agreeable.    1:00 AM Repeat trop negative.  Pt discharged with outpatient follow up instructions, return precautions.      At the conclusion of the encounter I discussed the results of all the tests and the disposition. The questions were answered. The patient or family acknowledged understanding and was agreeable with the care plan.    PPE: Provider wore gloves, N95 mask, eye protection, surgical cap, and paper mask.    MEDICATIONS GIVEN IN THE EMERGENCY DEPARTMENT:  Medications   iopamidol (ISOVUE-370) solution 75  "mL (75 mLs Intravenous Given 9/3/21 3024)       NEW PRESCRIPTIONS STARTED AT TODAY'S ED VISIT:  New Prescriptions    DOCUSATE SODIUM (COLACE) 100 MG CAPSULE    Take 1 capsule (100 mg) by mouth 2 times daily    POLYETHYLENE GLYCOL (MIRALAX) 17 GM/DOSE POWDER    Take 17 g (1 capful) by mouth daily for 7 days       HPI       Jennifer Wall is a 76 year old female with a pertinent history of atrial fibrillation, ischemic stroke, cardioversion, heart murmur, hypertension, mitral value disease and hyperlipidemia who presents to this ED walk-in for evaluation of arm pain and nausea.     The patient reports the onset of b/l distal forearm pain and \"crampy\" abdominal pain around 1900 this evening. Currently her arm pain has subsided but her abdominal pain is still present. Of note, the patient has not had a bowel movement today. The patient notes that she has never experienced these symptoms before. The patient denies chest pain, diaphoresis, nausea, shortness of breath, vomiting, blood or black in stool, fever, cough, tingling or numbness or arms, swelling or pain in legs, rectal pain, dysuria, difficult urinating and any other symptoms or complaints at this time.      REVIEW OF SYSTEMS:  Review of Systems   Constitutional: Negative for chills, diaphoresis and fever.   HENT: Negative for sore throat.    Eyes: Negative for visual disturbance.   Respiratory: Negative for shortness of breath.    Cardiovascular: Negative for chest pain and leg swelling.   Gastrointestinal: Positive for abdominal pain and constipation. Negative for blood in stool, diarrhea, nausea, rectal pain and vomiting.        Negative for melena.   Endocrine: Negative for polyuria.   Genitourinary: Negative for dysuria and hematuria.        - urinary changes     Musculoskeletal: Positive for myalgias (forearms). Negative for joint swelling.        Negative for tingling or numbness or arms.    Skin: Negative for rash.   Neurological: Negative for dizziness. "   Psychiatric/Behavioral: Negative for confusion.   All other systems reviewed and are negative.            Medical History     Past Medical History:   Diagnosis Date     Atrial fibrillation (H) 2008     Heart murmur 2019     History of cardioversion 2009     Hypertension 2008     Sleep apnea 1999     Stroke (H) 2008       Past Surgical History:   Procedure Laterality Date     HYSTERECTOMY         Family History   Problem Relation Age of Onset     No Known Problems Mother      No Known Problems Father      No Known Problems Sister      No Known Problems Daughter      No Known Problems Maternal Grandmother      No Known Problems Maternal Grandfather      No Known Problems Paternal Grandmother      No Known Problems Paternal Grandfather      No Known Problems Maternal Aunt      No Known Problems Paternal Aunt      Hereditary Breast and Ovarian Cancer Syndrome No family hx of      Breast Cancer No family hx of      Cancer No family hx of      Colon Cancer No family hx of      Endometrial Cancer No family hx of      Ovarian Cancer No family hx of        Social History     Tobacco Use     Smoking status: Never Smoker     Smokeless tobacco: Never Used   Substance Use Topics     Alcohol use: None     Drug use: None       docusate sodium (COLACE) 100 MG capsule  polyethylene glycol (MIRALAX) 17 GM/Dose powder  alendronate (FOSAMAX) 70 MG tablet  atorvastatin (LIPITOR) 10 MG tablet  Ca-D3-mag#11-zinc-cupr-man-bor (CALTRATE 600+D PLUS MINERALS) 600 mg calcium- 800 unit-50 mg Tab  cholecalciferol, vitamin D3, 1,000 unit tablet  clindamycin (CLEOCIN) 150 MG capsule  diphenoxylate-atropine (LOMOTIL) 2.5-0.025 mg per tablet  fluticasone (FLONASE) 50 mcg/actuation nasal spray  lisinopril-hydrochlorothiazide (PRINZIDE,ZESTORETIC) 20-12.5 mg per tablet  multivitamin (ONE A DAY) per tablet  potassium chloride (K-DUR,KLOR-CON) 20 MEQ tablet  timolol maleate (TIMOPTIC) 0.5 % ophthalmic solution  warfarin (COUMADIN) 5 MG  tablet        Physical Exam     Triage Vitals:  ED Triage Vitals   Enc Vitals Group      BP 09/03/21 2206 (!) 183/84      Pulse 09/03/21 2206 78      Resp 09/03/21 2210 16      Temp 09/03/21 2212 98  F (36.7  C)      Temp src 09/03/21 2212 Oral      SpO2 --       Weight --       Height --       Head Circumference --       Peak Flow --       Pain Score --       Pain Loc --       Pain Edu? --       Excl. in GC? --         Vitals:  /88   Pulse 77   Temp 98  F (36.7  C) (Oral)   Resp 16   SpO2 100%     PHYSICAL EXAM:   Physical Exam  Vitals and nursing note reviewed.   Constitutional:       General: She is not in acute distress.     Comments: Appears elderly and frail.    HENT:      Head: Normocephalic and atraumatic.      Nose: Nose normal.      Mouth/Throat:      Mouth: Mucous membranes are moist.   Eyes:      Pupils: Pupils are equal, round, and reactive to light.   Neck:      Vascular: No JVD.   Cardiovascular:      Rate and Rhythm: Normal rate. Rhythm irregularly irregular.      Pulses: Normal pulses.           Radial pulses are 2+ on the right side and 2+ on the left side.        Dorsalis pedis pulses are 2+ on the right side and 2+ on the left side.   Pulmonary:      Effort: Pulmonary effort is normal. No respiratory distress.      Breath sounds: Normal breath sounds.   Abdominal:      General: There is no distension.      Palpations: Abdomen is soft.      Tenderness: There is abdominal tenderness.      Comments: Mild lower abdominal tenderness. No rigidity.    Musculoskeletal:      Cervical back: Full passive range of motion without pain and neck supple.      Comments: No calf tenderness or swelling b/l   Skin:     General: Skin is warm.      Findings: No rash.   Neurological:      General: No focal deficit present.      Mental Status: She is alert. Mental status is at baseline.      Comments: Fluent speech, no acute lateralizing deficits   Psychiatric:         Mood and Affect: Mood normal.          Behavior: Behavior normal.           Results     LAB:  All pertinent labs reviewed and interpreted  Labs Ordered and Resulted from Time of ED Arrival Up to the Time of Departure from the ED   BASIC METABOLIC PANEL - Abnormal; Notable for the following components:       Result Value    Glucose 149 (*)     GFR Estimate 59 (*)     All other components within normal limits   CBC WITH PLATELETS AND DIFFERENTIAL - Abnormal; Notable for the following components:    Platelet Count 145 (*)     All other components within normal limits   HEPATIC FUNCTION PANEL - Abnormal; Notable for the following components:    Bilirubin Total 1.6 (*)     All other components within normal limits   LIPASE - Abnormal; Notable for the following components:    Lipase 53 (*)     All other components within normal limits   INR - Abnormal; Notable for the following components:    INR 2.18 (*)     All other components within normal limits   TROPONIN I - Normal   CBC WITH PLATELETS & DIFFERENTIAL    Narrative:     The following orders were created for panel order CBC with Platelets & Differential.  Procedure                               Abnormality         Status                     ---------                               -----------         ------                     CBC with platelets and d...[[id number]]  Abnormal            Final result                 Please view results for these tests on the individual orders.   EXTRA BLUE TOP TUBE   EXTRA RED TOP TUBE   EXTRA GREEN TOP (LITHIUM HEPARIN) TUBE   EXTRA PURPLE TOP TUBE   TROPONIN I   PERIPHERAL IV CATHETER   EXTRA TUBE    Narrative:     The following orders were created for panel order [place] Draw.  Procedure                               Abnormality         Status                     ---------                               -----------         ------                     Extra Blue Top Tube[[id number]]                              Final result               Extra Red Top Tube[[id number]]                                Final result               Extra Green Top (Lithium...[[id number]]                      Final result               Extra Purple Top Tube[[id number]]                            Final result                 Please view results for these tests on the individual orders.       RADIOLOGY:  CT Abdomen Pelvis w Contrast   Final Result   IMPRESSION:    1.  Stable cystic lesion in the body of the pancreas.   2.  Left renal cysts.   3.  Mild constipation. No evidence for bowel obstruction.                   ECG:  Afib, rate 90, no ischemia, similar to previous EKGs    PROCEDURES:  Procedures:  none      FINAL IMPRESSION:    ICD-10-CM    1. Constipation, unspecified constipation type  K59.00    2. Muscle pain  M79.10        0 minutes of critical care time      I, Rhiannon Lauryn, am serving as a scribe to document services personally performed by Dr. Papo Byrnes, based on my observations and the provider's statements to me. I, Papo Byrnes, DO attest that Rhiannonlan Snowton is acting in a scribe capacity, has observed my performance of the services and has documented them in accordance with my direction.      Papo Byrnes DO  Emergency Medicine  [facility] EMERGENCY DEPARTMENT  9/3/2021  10:51 PM          Papo Byrnes MD  09/04/21 0058

## 2021-09-04 NOTE — DISCHARGE INSTRUCTIONS
Follow up closely with primary clinic. Take miralax and colace regularly for next week or so with goal of 1-2 stools daily.  Continue metamucil and stay hydrated with good water intake.  Return for new/worsening concerns.

## 2022-05-26 ENCOUNTER — HOSPITAL ENCOUNTER (EMERGENCY)
Facility: HOSPITAL | Age: 77
Discharge: HOME | End: 2022-05-26
Attending: EMERGENCY MEDICINE | Admitting: EMERGENCY MEDICINE
Payer: MEDICARE

## 2022-05-26 ENCOUNTER — APPOINTMENT (OUTPATIENT)
Dept: CT IMAGING | Facility: HOSPITAL | Age: 77
End: 2022-05-26
Attending: EMERGENCY MEDICINE
Payer: MEDICARE

## 2022-05-26 VITALS
DIASTOLIC BLOOD PRESSURE: 95 MMHG | RESPIRATION RATE: 20 BRPM | TEMPERATURE: 98.3 F | OXYGEN SATURATION: 99 % | SYSTOLIC BLOOD PRESSURE: 184 MMHG | BODY MASS INDEX: 18.49 KG/M2 | HEART RATE: 99 BPM | HEIGHT: 68 IN | WEIGHT: 122 LBS

## 2022-05-26 DIAGNOSIS — S22.080A COMPRESSION FRACTURE OF T12 VERTEBRA, INITIAL ENCOUNTER (H): ICD-10-CM

## 2022-05-26 DIAGNOSIS — R10.32 LLQ ABDOMINAL PAIN: ICD-10-CM

## 2022-05-26 DIAGNOSIS — S32.030A CLOSED COMPRESSION FRACTURE OF L3 LUMBAR VERTEBRA, INITIAL ENCOUNTER (H): ICD-10-CM

## 2022-05-26 DIAGNOSIS — S32.020A COMPRESSION FRACTURE OF L2 VERTEBRA, INITIAL ENCOUNTER (H): ICD-10-CM

## 2022-05-26 LAB
ALBUMIN SERPL-MCNC: 4.2 G/DL (ref 3.5–5)
ALBUMIN UR-MCNC: NEGATIVE MG/DL
ALP SERPL-CCNC: 122 U/L (ref 45–120)
ALT SERPL W P-5'-P-CCNC: 15 U/L (ref 0–45)
ANION GAP SERPL CALCULATED.3IONS-SCNC: 9 MMOL/L (ref 5–18)
APPEARANCE UR: CLEAR
AST SERPL W P-5'-P-CCNC: 23 U/L (ref 0–40)
BASOPHILS # BLD AUTO: 0 10E3/UL (ref 0–0.2)
BASOPHILS NFR BLD AUTO: 1 %
BILIRUB DIRECT SERPL-MCNC: 0.5 MG/DL
BILIRUB SERPL-MCNC: 1.4 MG/DL (ref 0–1)
BILIRUB UR QL STRIP: NEGATIVE
BUN SERPL-MCNC: 21 MG/DL (ref 8–28)
C REACTIVE PROTEIN LHE: <0.1 MG/DL (ref 0–0.8)
CALCIUM SERPL-MCNC: 9.7 MG/DL (ref 8.5–10.5)
CHLORIDE BLD-SCNC: 104 MMOL/L (ref 98–107)
CO2 SERPL-SCNC: 27 MMOL/L (ref 22–31)
COLOR UR AUTO: ABNORMAL
CREAT SERPL-MCNC: 0.68 MG/DL (ref 0.6–1.1)
EOSINOPHIL # BLD AUTO: 0.1 10E3/UL (ref 0–0.7)
EOSINOPHIL NFR BLD AUTO: 1 %
ERYTHROCYTE [DISTWIDTH] IN BLOOD BY AUTOMATED COUNT: 14 % (ref 10–15)
GFR SERPL CREATININE-BSD FRML MDRD: 88 ML/MIN/1.73M2
GLUCOSE BLD-MCNC: 100 MG/DL (ref 70–125)
GLUCOSE UR STRIP-MCNC: NEGATIVE MG/DL
HCT VFR BLD AUTO: 41.3 % (ref 35–47)
HGB BLD-MCNC: 13.2 G/DL (ref 11.7–15.7)
HGB UR QL STRIP: NEGATIVE
HYALINE CASTS: 1 /LPF
IMM GRANULOCYTES # BLD: 0 10E3/UL
IMM GRANULOCYTES NFR BLD: 0 %
INR PPP: 1.73 (ref 0.85–1.15)
KETONES UR STRIP-MCNC: NEGATIVE MG/DL
LEUKOCYTE ESTERASE UR QL STRIP: NEGATIVE
LIPASE SERPL-CCNC: 19 U/L (ref 0–52)
LYMPHOCYTES # BLD AUTO: 1.1 10E3/UL (ref 0.8–5.3)
LYMPHOCYTES NFR BLD AUTO: 20 %
MAGNESIUM SERPL-MCNC: 1.7 MG/DL (ref 1.8–2.6)
MCH RBC QN AUTO: 32 PG (ref 26.5–33)
MCHC RBC AUTO-ENTMCNC: 32 G/DL (ref 31.5–36.5)
MCV RBC AUTO: 100 FL (ref 78–100)
MONOCYTES # BLD AUTO: 0.4 10E3/UL (ref 0–1.3)
MONOCYTES NFR BLD AUTO: 7 %
MUCOUS THREADS #/AREA URNS LPF: PRESENT /LPF
NEUTROPHILS # BLD AUTO: 3.9 10E3/UL (ref 1.6–8.3)
NEUTROPHILS NFR BLD AUTO: 71 %
NITRATE UR QL: NEGATIVE
NRBC # BLD AUTO: 0 10E3/UL
NRBC BLD AUTO-RTO: 0 /100
PH UR STRIP: 6.5 [PH] (ref 5–7)
PLATELET # BLD AUTO: 187 10E3/UL (ref 150–450)
POTASSIUM BLD-SCNC: 3.5 MMOL/L (ref 3.5–5)
PROT SERPL-MCNC: 7.6 G/DL (ref 6–8)
RBC # BLD AUTO: 4.13 10E6/UL (ref 3.8–5.2)
RBC URINE: 2 /HPF
SODIUM SERPL-SCNC: 140 MMOL/L (ref 136–145)
SP GR UR STRIP: 1.02 (ref 1–1.03)
SQUAMOUS EPITHELIAL: <1 /HPF
UROBILINOGEN UR STRIP-MCNC: <2 MG/DL
WBC # BLD AUTO: 5.5 10E3/UL (ref 4–11)
WBC URINE: 4 /HPF

## 2022-05-26 PROCEDURE — 83735 ASSAY OF MAGNESIUM: CPT | Performed by: EMERGENCY MEDICINE

## 2022-05-26 PROCEDURE — 36415 COLL VENOUS BLD VENIPUNCTURE: CPT | Performed by: EMERGENCY MEDICINE

## 2022-05-26 PROCEDURE — 83690 ASSAY OF LIPASE: CPT | Performed by: EMERGENCY MEDICINE

## 2022-05-26 PROCEDURE — 85025 COMPLETE CBC W/AUTO DIFF WBC: CPT | Performed by: EMERGENCY MEDICINE

## 2022-05-26 PROCEDURE — 82248 BILIRUBIN DIRECT: CPT | Performed by: EMERGENCY MEDICINE

## 2022-05-26 PROCEDURE — 85610 PROTHROMBIN TIME: CPT | Performed by: EMERGENCY MEDICINE

## 2022-05-26 PROCEDURE — 81001 URINALYSIS AUTO W/SCOPE: CPT | Performed by: EMERGENCY MEDICINE

## 2022-05-26 PROCEDURE — 99285 EMERGENCY DEPT VISIT HI MDM: CPT | Mod: 25

## 2022-05-26 PROCEDURE — 80053 COMPREHEN METABOLIC PANEL: CPT | Performed by: EMERGENCY MEDICINE

## 2022-05-26 PROCEDURE — 250N000011 HC RX IP 250 OP 636: Performed by: EMERGENCY MEDICINE

## 2022-05-26 PROCEDURE — 86140 C-REACTIVE PROTEIN: CPT | Performed by: EMERGENCY MEDICINE

## 2022-05-26 PROCEDURE — 74177 CT ABD & PELVIS W/CONTRAST: CPT

## 2022-05-26 RX ORDER — IOPAMIDOL 755 MG/ML
100 INJECTION, SOLUTION INTRAVASCULAR ONCE
Status: COMPLETED | OUTPATIENT
Start: 2022-05-26 | End: 2022-05-26

## 2022-05-26 RX ADMIN — IOPAMIDOL 100 ML: 755 INJECTION, SOLUTION INTRAVENOUS at 15:49

## 2022-05-26 ASSESSMENT — ENCOUNTER SYMPTOMS
NUMBNESS: 0
APPETITE CHANGE: 0
SHORTNESS OF BREATH: 0
WEAKNESS: 0
NAUSEA: 0
CONFUSION: 0
ABDOMINAL PAIN: 1
HEADACHES: 0
VOMITING: 0
BRUISES/BLEEDS EASILY: 1
DIFFICULTY URINATING: 0
DIAPHORESIS: 0

## 2022-05-26 NOTE — DISCHARGE INSTRUCTIONS
As we discussed your compression fractures are worsening therefore recommend follow-up with your orthopedic surgeon.  Use Tylenol for pain.  Return to the ER for worsening pain, numbness, weakness, fever, vomiting or other concern.

## 2022-05-26 NOTE — ED TRIAGE NOTES
Patient with LLQ pain that started yesterday. Patient with recent hx of compression fractures and TSLO brace on. Denies nausea or vomiting, but had diarrhea on Tuesday.      Triage Assessment     Row Name 05/26/22 [id number]       Triage Assessment (Adult)    Airway WDL WDL       Respiratory WDL    Respiratory WDL WDL       Skin Circulation/Temperature WDL    Skin Circulation/Temperature WDL WDL       Cardiac WDL    Cardiac WDL WDL       Peripheral/Neurovascular WDL    Peripheral Neurovascular WDL WDL       Cognitive/Neuro/Behavioral WDL    Cognitive/Neuro/Behavioral WDL WDL

## 2022-05-26 NOTE — ED NOTES
ED Triage Provider Note  [facility]  Encounter Date: May 26, 2022      The patient was seen in triage to expedite ED workup.     History:  Chief Complaint   Patient presents with     Abdominal Pain     Jennifer Wall is a 77 year old female with history of a-fib (takes warfarin), hysterectomy presenting for evaluation of abdominal pain. Reports left-sided abdominal pain since yesterday; constant. No nausea or vomiting. Had diarrhea yesterday but not today. No urinary symptoms. Wearing a TLSO brace for known T/L spine compression fractures since 12/2021. Denies any numbness, tingling, focal weakness, any other problems or complaints at this time.    Review of Systems:  - Positive for abdominal pain, diarrhea  - Negative for nausea, vomiting, numbness, tingling, focal weakness    Vitals:  BP (!) 184/112   Pulse 98   Temp 97.9  F (36.6  C) (Oral)   Resp 18   Wt 54.4 kg (120 lb)   SpO2 100%   BMI 18.25 kg/m      Brief Exam:  steady unaccompanied gait, normal work of breathing, clear mentation, TLSO brace in place, LLQ tenderness under TLSO brace with no rigidity---rest of abdominal palpation limited by brace    Medical Decision Making:  Patient arriving to the ED with problem as above. A medical screening exam was performed. Orders initiated from triage to expedite ED workup.    Given age with new abdominal pain; will obtain CT in addition to usual blood/urine tests. SBO, diverticulitis top the differential. Low clinical concern for acute aortic syndrome.    The patient is appropriate to wait in triage until ED room available.      Orders Placed This Encounter   Procedures     CT Abdomen Pelvis w Contrast     INR     Basic metabolic panel     Lipase     Hepatic function panel     Magnesium     UA with Microscopic reflex to Culture     CRP inflammation     Peripheral IV catheter     CBC with platelets differential           Tristen Tran MD  05/26/22  Emergency Medicine  [facility]  [facility] EMERGENCY DEPARTMENT  [address] [place] [id number]  [phone]  Dept: [phone]     Tristen Tran MD  05/26/22 6673

## 2022-05-26 NOTE — ED PROVIDER NOTES
EMERGENCY DEPARTMENT ENCOUNTER      NAME: Jennifer Wall  AGE: 77 year old female  YOB: 1945  MRN: [id number]  EVALUATION DATE & TIME: 5/26/2022  1:49 PM    PCP: Palmer Lemus    ED PROVIDER: Brett Snyder MD        Chief Complaint   Patient presents with     Abdominal Pain         FINAL IMPRESSION:  1. LLQ abdominal pain    2. Compression fracture of L2 vertebra, initial encounter (H)    3. Compression fracture of T12 vertebra, initial encounter (H)    4. Closed compression fracture of L3 lumbar vertebra, initial encounter (H)          ED COURSE & MEDICAL DECISION MAKING:    Pertinent Labs & Imaging studies reviewed. (See chart for details)  77 year old female presents to the Emergency Department for evaluation of left lower quadrant pain that developed while going up stairs yesterday.  Was wearing TLSO since compression fractures from December    No new fall.     Reports the brace does tend to stabbing jab into her abdomen from time to time.  No numbness or weakness    CT and labs do not show evidence of diverticulitis, colitis, constipation, AAA, hematoma, abscess, or infectious process    Nonspecific edema likely secondary to her TLSO irritating her musculature.  Pain with movement therefore supsect muscle skeletal cause of pain    Spoke with [facility] regarding worsening compression fracture seen on imaging.  Plan for follow-up with [facility]    4:30 PM Medical student met with the patient to obtain history and perform their initial exam.    5:01 PM I met with the patient for the initial interview and physical examination. Discussed plan for treatment and workup in the ED.   5:13 PM I spoke with [facility], Dr. Zaman, who says the patient is okay to go home and follow-up with her doctor.   5:16 PM I rechecked and updated the patient.    At the conclusion of the encounter I discussed the results of all of the tests and the disposition. The questions were answered. The patient or  "family acknowledged understanding and was agreeable with the care plan.          PPE worn: Eye protection, N95 mask, gloves.       MEDICATIONS GIVEN IN THE EMERGENCY:  Medications   iopamidol (ISOVUE-370) solution 100 mL (100 mLs Intravenous Given 5/26/22 1549)       NEW PRESCRIPTIONS STARTED AT TODAY'S ER VISIT  Discharge Medication List as of 5/26/2022  5:22 PM             =================================================================    HPI    Triage note  \"Patient with LLQ pain that started yesterday. Patient with recent hx of compression fractures and TSLO brace on. Denies nausea or vomiting, but had diarrhea on Tuesday.      Triage Assessment     Row Name 05/26/22 1306       Triage Assessment (Adult)    Airway WDL WDL       Respiratory WDL    Respiratory WDL WDL       Skin Circulation/Temperature WDL    Skin Circulation/Temperature WDL WDL       Cardiac WDL    Cardiac WDL WDL       Peripheral/Neurovascular WDL    Peripheral Neurovascular WDL WDL       Cognitive/Neuro/Behavioral WDL    Cognitive/Neuro/Behavioral WDL WDL              \"      Patient information was obtained from: Patient     Use of : N/A         Jennifer Wall is a 77 year old female with a pertinent history of hypertension, hyperlipidemia, atrial fibrillation, stroke, Gilbert's syndrome, anemia, and s/p hysterectomy who presents to this ED by private vehicle for evaluation of abdominal pain.     Patient reports that she developed sharp pain to the left side of her abdomen around 3 PM yesterday when she was walking up the stairs. The pain lasted for ~30 seconds at that time. Today the patient has continued to have abdominal pain that is provoked by movement and standing up. Her last bowel movement was yesterday. She has not eaten anything or taken her usual medications today. Patient is currently wearing a TLSO brace for known compression fractures since December 2021; she takes Tylenol for her pain and follows with [facility]. " No new back pain. No recent falls. The patient is able to walk. She otherwise denies sweats, appetite change, shortness of breath, nausea, vomiting, headache, rash, weakness or numbness in her legs, or additional symptoms or complaints at this time.        REVIEW OF SYSTEMS   Review of Systems   Constitutional: Negative for appetite change and diaphoresis.   Respiratory: Negative for shortness of breath.    Cardiovascular: Negative for chest pain.   Gastrointestinal: Positive for abdominal pain. Negative for nausea and vomiting.   Genitourinary: Negative for difficulty urinating.   Musculoskeletal: Negative for gait problem.   Skin: Negative for rash.   Allergic/Immunologic: Negative for immunocompromised state.   Neurological: Negative for weakness, numbness and headaches.   Hematological: Bruises/bleeds easily.   Psychiatric/Behavioral: Negative for confusion.      PAST MEDICAL HISTORY:  Past Medical History:   Diagnosis Date     Atrial fibrillation (H) 2008     Heart murmur 2019    Aortic valve sclerosis     History of cardioversion 2009     Hypertension 2008     Sleep apnea 1999    previously used CPAP. Lost weight and no longer uses CPAP or has sleep apnea     Stroke (H) 2008       PAST SURGICAL HISTORY:  Past Surgical History:   Procedure Laterality Date     HYSTERECTOMY             CURRENT MEDICATIONS:    alendronate (FOSAMAX) 70 MG tablet  atorvastatin (LIPITOR) 10 MG tablet  Ca-D3-mag#11-zinc-cupr-man-bor (CALTRATE 600+D PLUS MINERALS) 600 mg calcium- 800 unit-50 mg Tab  cholecalciferol, vitamin D3, 1,000 unit tablet  clindamycin (CLEOCIN) 150 MG capsule  diphenoxylate-atropine (LOMOTIL) 2.5-0.025 mg per tablet  docusate sodium (COLACE) 100 MG capsule  fluticasone (FLONASE) 50 mcg/actuation nasal spray  lisinopril-hydrochlorothiazide (PRINZIDE,ZESTORETIC) 20-12.5 mg per tablet  multivitamin (ONE A DAY) per tablet  potassium chloride (K-DUR,KLOR-CON) 20 MEQ tablet  timolol maleate (TIMOPTIC) 0.5 % ophthalmic  "solution  warfarin (COUMADIN) 5 MG tablet        ALLERGIES:  Allergies   Allergen Reactions     Aspirin (Tartrazine Only) [Tartrazine] Unknown     Azithromycin Unknown     Latex Unknown     Morphine Unknown     Nitrofuran Analogues [Nitrofurantoin] Unknown     Penicillins Unknown     Sulfa (Sulfonamide Antibiotics) [Sulfa Drugs] Unknown       FAMILY HISTORY:  Family History   Problem Relation Age of Onset     No Known Problems Mother      No Known Problems Father      No Known Problems Sister      No Known Problems Daughter      No Known Problems Maternal Grandmother      No Known Problems Maternal Grandfather      No Known Problems Paternal Grandmother      No Known Problems Paternal Grandfather      No Known Problems Maternal Aunt      No Known Problems Paternal Aunt      Hereditary Breast and Ovarian Cancer Syndrome No family hx of      Breast Cancer No family hx of      Cancer No family hx of      Colon Cancer No family hx of      Endometrial Cancer No family hx of      Ovarian Cancer No family hx of        SOCIAL HISTORY:   Social History     Socioeconomic History     Marital status:    Tobacco Use     Smoking status: Never Smoker     Smokeless tobacco: Never Used       VITALS:  BP (!) 184/95   Pulse 99   Temp 98.3  F (36.8  C) (Oral)   Resp 20   Ht 1.727 m (5' 8\")   Wt 55.3 kg (122 lb)   SpO2 99%   BMI 18.55 kg/m      PHYSICAL EXAM      Vitals: BP (!) 184/95   Pulse 99   Temp 98.3  F (36.8  C) (Oral)   Resp 20   Ht 1.727 m (5' 8\")   Wt 55.3 kg (122 lb)   SpO2 99%   BMI 18.55 kg/m    General: Appears in no acute distress, awake, alert, interactive.  Wearing TLSO  Eyes: Conjunctivae non-injected. Sclera anicteric.  HENT: Atraumatic.  Neck: Supple.  Respiratory/Chest: Respiration unlabored.  Abdomen: non distended, mild left lower quadrant tenderness  Musculoskeletal: Normal extremities. No edema or erythema. 5/ 5 strength lower extremity  Skin: Normal color. No rash or " diaphoresis.  Neurologic: Face symmetric, moves all extremities spontaneously. Speech clear.  Psychiatric: Oriented to person, place, and time. Affect appropriate.       LAB:  All pertinent labs reviewed and interpreted.  Results for orders placed or performed during the hospital encounter of 05/26/22   CT Abdomen Pelvis w Contrast    Impression    IMPRESSION:   1.  Trace pleural and ascitic fluid and mild mesenteric and subcutaneous edema have developed.  2.  Severe osteoporosis with progression of severe L2 vertebral body compression fracture and development of mild to moderate compression fractures of the T12, L1, L3 and L4 vertebral bodies.  3.  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 Recommend follow-up in 2 years.    REFERENCE:  Revisions of international consensus [place] guidelines for the management of IPMN of the pancreas. Pancreatology 2017;17(5):738-753.    Largest cyst between 10-20 mm: CT or MRI/MRCP every 6 months for 1 year and then yearly for 2 years and then every 2 years if no change.      Result Value Ref Range    INR 1.73 (H) 0.85 - 1.15   Basic metabolic panel   Result Value Ref Range    Sodium 140 136 - 145 mmol/L    Potassium 3.5 3.5 - 5.0 mmol/L    Chloride 104 98 - 107 mmol/L    Carbon Dioxide (CO2) 27 22 - 31 mmol/L    Anion Gap 9 5 - 18 mmol/L    Urea Nitrogen 21 8 - 28 mg/dL    Creatinine 0.68 0.60 - 1.10 mg/dL    Calcium 9.7 8.5 - 10.5 mg/dL    Glucose 100 70 - 125 mg/dL    GFR Estimate 88 >60 mL/min/1.73m2   Result Value Ref Range    Lipase 19 0 - 52 U/L   Hepatic function panel   Result Value Ref Range    Bilirubin Total 1.4 (H) 0.0 - 1.0 mg/dL    Bilirubin Direct 0.5 <=0.5 mg/dL    Protein Total 7.6 6.0 - 8.0 g/dL    Albumin 4.2 3.5 - 5.0 g/dL    Alkaline Phosphatase 122 (H) 45 - 120 U/L    AST 23 0 - 40 U/L    ALT 15 0 - 45 U/L   Result Value Ref Range    Magnesium 1.7 (L) 1.8 - 2.6 mg/dL   UA with Microscopic reflex to Culture    Specimen:  Urine, Midstream   Result Value Ref Range    Color Urine Light Yellow Colorless, Straw, Light Yellow, Yellow    Appearance Urine Clear Clear    Glucose Urine Negative Negative mg/dL    Bilirubin Urine Negative Negative    Ketones Urine Negative Negative mg/dL    Specific Gravity Urine 1.016 1.001 - 1.030    Blood Urine Negative Negative    pH Urine 6.5 5.0 - 7.0    Protein Albumin Urine Negative Negative mg/dL    Urobilinogen Urine <2.0 <2.0 mg/dL    Nitrite Urine Negative Negative    Leukocyte Esterase Urine Negative Negative    Mucus Urine Present (A) None Seen /LPF    RBC Urine 2 <=2 /HPF    WBC Urine 4 <=5 /HPF    Squamous Epithelials Urine <1 <=1 /HPF    Hyaline Casts Urine 1 <=2 /LPF   CRP inflammation   Result Value Ref Range    CRP <0.1 0.0 - 0.8 mg/dL   CBC with platelets and differential   Result Value Ref Range    WBC Count 5.5 4.0 - 11.0 10e3/uL    RBC Count 4.13 3.80 - 5.20 10e6/uL    Hemoglobin 13.2 11.7 - 15.7 g/dL    Hematocrit 41.3 35.0 - 47.0 %     78 - 100 fL    MCH 32.0 26.5 - 33.0 pg    MCHC 32.0 31.5 - 36.5 g/dL    RDW 14.0 10.0 - 15.0 %    Platelet Count 187 150 - 450 10e3/uL    % Neutrophils 71 %    % Lymphocytes 20 %    % Monocytes 7 %    % Eosinophils 1 %    % Basophils 1 %    % Immature Granulocytes 0 %    NRBCs per 100 WBC 0 <1 /100    Absolute Neutrophils 3.9 1.6 - 8.3 10e3/uL    Absolute Lymphocytes 1.1 0.8 - 5.3 10e3/uL    Absolute Monocytes 0.4 0.0 - 1.3 10e3/uL    Absolute Eosinophils 0.1 0.0 - 0.7 10e3/uL    Absolute Basophils 0.0 0.0 - 0.2 10e3/uL    Absolute Immature Granulocytes 0.0 <=0.4 10e3/uL    Absolute NRBCs 0.0 10e3/uL       RADIOLOGY:  Reviewed all pertinent imaging. Please see official radiology report.  CT Abdomen Pelvis w Contrast   Final Result   IMPRESSION:    1.  Trace pleural and ascitic fluid and mild mesenteric and subcutaneous edema have developed.   2.  Severe osteoporosis with progression of severe L2 vertebral body compression fracture and development  of mild to moderate compression fractures of the T12, L1, L3 and L4 vertebral bodies.   3.  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 Recommend follow-up in 2 years.      REFERENCE:   Revisions of international consensus [place] guidelines for the management of IPMN of the pancreas. Pancreatology 2017;17(5):738-753.      Largest cyst between 10-20 mm: CT or MRI/MRCP every 6 months for 1 year and then yearly for 2 years and then every 2 years if no change.                  I, Emmy Stern, am serving as a scribe to document services personally performed by Tom Snyder MD based on my observation and the provider's statements to me. I, Dr. Tom Snyder, attest that Emmy Stern is acting in a scribe capacity, has observed my performance of the services and has documented them in accordance with my direction.    Tom Snyder MD  Emergency Medicine  [facility] EMERGENCY DEPARTMENT  [address] [place] [id number] [phone]     Tom Snyder MD  05/26/22 1414

## 2023-04-08 NOTE — TELEPHONE ENCOUNTER
----- Message from Aaron Briones sent at 11/25/2019  9:25 AM CST -----  Regarding: Question about holding warfarin  General phone call:    Caller: Pts     Primary cardiologist: Dr Pereyra    Detailed reason for call: Asking about Pts upcoming colonoscopy 12/5 and orders regarding warfarin.(there was a message in on 11/15 about this)    Pt will actually be here around 1230pm and wanted to speak in person (if possible)    I will still take a ph # to call    New or active symptoms? na    Best phone number: [phone]    Best time to contact: any - or can talk around 1230 at [facility] office     Ok to leave a detailed message? yes    Device? no      Additional Info:        150

## 2024-08-14 ENCOUNTER — TRANSFERRED RECORDS (OUTPATIENT)
Dept: HEALTH INFORMATION MANAGEMENT | Facility: CLINIC | Age: 79
End: 2024-08-14

## 2024-09-04 ENCOUNTER — MEDICAL CORRESPONDENCE (OUTPATIENT)
Dept: HEALTH INFORMATION MANAGEMENT | Facility: CLINIC | Age: 79
End: 2024-09-04

## 2024-09-04 ENCOUNTER — TRANSFERRED RECORDS (OUTPATIENT)
Dept: HEALTH INFORMATION MANAGEMENT | Facility: CLINIC | Age: 79
End: 2024-09-04

## 2024-10-17 DIAGNOSIS — M85.89 OTHER SPECIFIED DISORDERS OF BONE DENSITY AND STRUCTURE, MULTIPLE SITES: Primary | ICD-10-CM

## 2024-10-17 RX ORDER — DIPHENHYDRAMINE HYDROCHLORIDE 50 MG/ML
50 INJECTION INTRAMUSCULAR; INTRAVENOUS
Status: CANCELLED
Start: 2024-10-18

## 2024-10-17 RX ORDER — ZOLEDRONIC ACID 0.05 MG/ML
5 INJECTION, SOLUTION INTRAVENOUS ONCE
Status: CANCELLED
Start: 2024-10-18

## 2024-10-17 RX ORDER — ACETAMINOPHEN 325 MG/1
650 TABLET ORAL
Status: CANCELLED | OUTPATIENT
Start: 2024-10-18

## 2024-10-17 RX ORDER — ALBUTEROL SULFATE 0.83 MG/ML
2.5 SOLUTION RESPIRATORY (INHALATION)
Status: CANCELLED | OUTPATIENT
Start: 2024-10-18

## 2024-10-17 RX ORDER — METHYLPREDNISOLONE SODIUM SUCCINATE 125 MG/2ML
125 INJECTION INTRAMUSCULAR; INTRAVENOUS
Status: CANCELLED
Start: 2024-10-18

## 2024-10-17 RX ORDER — MEPERIDINE HYDROCHLORIDE 25 MG/ML
25 INJECTION INTRAMUSCULAR; INTRAVENOUS; SUBCUTANEOUS EVERY 30 MIN PRN
Status: CANCELLED | OUTPATIENT
Start: 2024-10-18

## 2024-10-17 RX ORDER — HEPARIN SODIUM (PORCINE) LOCK FLUSH IV SOLN 100 UNIT/ML 100 UNIT/ML
5 SOLUTION INTRAVENOUS
Status: CANCELLED | OUTPATIENT
Start: 2024-10-18

## 2024-10-17 RX ORDER — HEPARIN SODIUM,PORCINE 10 UNIT/ML
5-20 VIAL (ML) INTRAVENOUS DAILY PRN
Status: CANCELLED | OUTPATIENT
Start: 2024-10-18

## 2024-10-17 RX ORDER — EPINEPHRINE 1 MG/ML
0.3 INJECTION, SOLUTION, CONCENTRATE INTRAVENOUS EVERY 5 MIN PRN
Status: CANCELLED | OUTPATIENT
Start: 2024-10-18

## 2024-10-17 RX ORDER — ALBUTEROL SULFATE 90 UG/1
1-2 INHALANT RESPIRATORY (INHALATION)
Status: CANCELLED
Start: 2024-10-18

## 2024-11-12 ENCOUNTER — INFUSION THERAPY VISIT (OUTPATIENT)
Dept: INFUSION THERAPY | Facility: HOSPITAL | Age: 79
End: 2024-11-12
Payer: MEDICARE

## 2024-11-12 VITALS
TEMPERATURE: 98.1 F | HEART RATE: 72 BPM | OXYGEN SATURATION: 97 % | SYSTOLIC BLOOD PRESSURE: 134 MMHG | DIASTOLIC BLOOD PRESSURE: 81 MMHG | RESPIRATION RATE: 16 BRPM

## 2024-11-12 DIAGNOSIS — M85.89 OTHER SPECIFIED DISORDERS OF BONE DENSITY AND STRUCTURE, MULTIPLE SITES: Primary | ICD-10-CM

## 2024-11-12 LAB
CALCIUM SERPL-MCNC: 10 MG/DL (ref 8.8–10.4)
CREAT SERPL-MCNC: 1.07 MG/DL (ref 0.51–0.95)
EGFRCR SERPLBLD CKD-EPI 2021: 52 ML/MIN/1.73M2

## 2024-11-12 PROCEDURE — 82565 ASSAY OF CREATININE: CPT | Performed by: INTERNAL MEDICINE

## 2024-11-12 PROCEDURE — 96365 THER/PROPH/DIAG IV INF INIT: CPT

## 2024-11-12 PROCEDURE — 250N000011 HC RX IP 250 OP 636: Performed by: INTERNAL MEDICINE

## 2024-11-12 PROCEDURE — 36415 COLL VENOUS BLD VENIPUNCTURE: CPT | Performed by: INTERNAL MEDICINE

## 2024-11-12 PROCEDURE — 82310 ASSAY OF CALCIUM: CPT | Performed by: INTERNAL MEDICINE

## 2024-11-12 RX ORDER — METHYLPREDNISOLONE SODIUM SUCCINATE 125 MG/2ML
125 INJECTION INTRAMUSCULAR; INTRAVENOUS
Status: CANCELLED
Start: 2025-11-12

## 2024-11-12 RX ORDER — ACETAMINOPHEN 325 MG/1
650 TABLET ORAL
Status: CANCELLED | OUTPATIENT
Start: 2025-11-12

## 2024-11-12 RX ORDER — HEPARIN SODIUM,PORCINE 10 UNIT/ML
5-20 VIAL (ML) INTRAVENOUS DAILY PRN
Status: CANCELLED | OUTPATIENT
Start: 2025-11-12

## 2024-11-12 RX ORDER — HEPARIN SODIUM (PORCINE) LOCK FLUSH IV SOLN 100 UNIT/ML 100 UNIT/ML
5 SOLUTION INTRAVENOUS
Status: CANCELLED | OUTPATIENT
Start: 2025-11-12

## 2024-11-12 RX ORDER — ZOLEDRONIC ACID 0.05 MG/ML
5 INJECTION, SOLUTION INTRAVENOUS ONCE
Status: COMPLETED | OUTPATIENT
Start: 2024-11-12 | End: 2024-11-12

## 2024-11-12 RX ORDER — EPINEPHRINE 1 MG/ML
0.3 INJECTION, SOLUTION INTRAMUSCULAR; SUBCUTANEOUS EVERY 5 MIN PRN
Status: CANCELLED | OUTPATIENT
Start: 2025-11-12

## 2024-11-12 RX ORDER — DIPHENHYDRAMINE HYDROCHLORIDE 50 MG/ML
50 INJECTION INTRAMUSCULAR; INTRAVENOUS
Status: CANCELLED
Start: 2025-11-12

## 2024-11-12 RX ORDER — ALBUTEROL SULFATE 90 UG/1
1-2 INHALANT RESPIRATORY (INHALATION)
Status: CANCELLED
Start: 2025-11-12

## 2024-11-12 RX ORDER — ZOLEDRONIC ACID 0.05 MG/ML
5 INJECTION, SOLUTION INTRAVENOUS ONCE
Status: CANCELLED
Start: 2025-11-12

## 2024-11-12 RX ORDER — ALBUTEROL SULFATE 0.83 MG/ML
2.5 SOLUTION RESPIRATORY (INHALATION)
Status: CANCELLED | OUTPATIENT
Start: 2025-11-12

## 2024-11-12 RX ORDER — MEPERIDINE HYDROCHLORIDE 25 MG/ML
25 INJECTION INTRAMUSCULAR; INTRAVENOUS; SUBCUTANEOUS EVERY 30 MIN PRN
Status: CANCELLED | OUTPATIENT
Start: 2025-11-12

## 2024-11-12 RX ADMIN — ZOLEDRONIC ACID 5 MG: 5 INJECTION, SOLUTION INTRAVENOUS at 12:57

## 2024-11-12 NOTE — PATIENT INSTRUCTIONS
Patient Education   Zoledronic Acid Injection 5 mg/100 mL (ZOLEDRONIC ACID SOLUTION - INJECTION)  This medicine is used for the following purposes:  bone disease  bone strength  Brand Name(s): Reclast  Generic Name: Zoledronic Acid  Instructions  This medicine is given as an IV injection into a vein.  Please ask your doctor, nurse, or pharmacist how to discard unused medicines safely.  This medicine should be given by a trained health care provider.  After using this medicine, drink fluids as directed by your doctor to help clear the medicine from the body.  Drink at least 2 glasses of water before treatment, unless instructed otherwise.  It may take several weeks for this medicine to fully work.  It is important that you keep taking each dose of this medicine on time even if you are feeling well.  If you miss a dose, contact your doctor for instructions.  Drug interactions can change how medicines work or increase risk for side effects. Tell your health care providers about all medicines taken. Include prescription and over-the-counter medicines, vitamins, and herbal medicines. Speak with your doctor or pharmacist before starting or stopping any medicine.  This medicine may affect the strength of your bones. If you have or are at increased risk for osteoporosis (weakening of the bones), your doctor may recommend foods with calcium and vitamin D.  Visit your dentist regularly. Proper care of your teeth is very important while taking this medicine. Brush your teeth and floss regularly.  Keep all appointments for medical exams and tests while on this medicine.  Cautions  This medicine can cause birth defects. Speak with your doctor about birth control methods that should be used while on this medicine and for some time after stopping it.  Tell your doctor and pharmacist if you ever had an allergic reaction to a medicine.  Do not use the medication any more than instructed.  This medicine may cause dizziness or  fainting. Do not stand or sit up quickly.  Your ability to stay alert or to react quickly may be impaired by this medicine. Do not drive or operate machinery until you know how this medicine will affect you.  Avoid smoking while on this medicine. Smoking may increase your risk for bone fractures.  If possible, avoid using with alcohol, marijuana, or other medicines that can cause dizziness or drowsiness. These include allergy/cold products, muscle relaxers, sleep aids, and pain relievers.  Do not breastfeed while on this medicine.  This medicine can hurt a new baby in the womb. If you become pregnant while on this medicine, tell your doctor immediately. Your doctor may switch you to a different medicine.  Some patients have serious side effects from this medicine. Ask your pharmacist to show you the information from the Food and Drug Administration (FDA) and discuss it with you.  Side Effects  The following is a list of some common side effects from this medicine. Please speak with your doctor about what you should do if you experience these or other side effects.  dizziness  lack of energy and tiredness  flu-like symptoms  headaches  pain, redness, swelling near injection  nausea  Call your doctor or get medical help right away if you notice any of these more serious side effects:  bone pain  fever or chills  numbness or tingling in hands and feet  fast or irregular heart beats  jaw pain  signs of kidney damage (such as change in urine color or bubbly urine)  mouth sores or irritation  muscle cramps or weakness  tight or rigid muscles  joint or muscle pain  eye pain or swelling  red, burning, or itchy skin  redness of eyes  seizures  sensitivity to light  shortness of breath  skin tingling, burning or prickly feeling  unusual or unexplained tiredness or weakness  blurring or changes of vision  weakness  A few people may have an allergic reaction to this medicine. Symptoms can include difficulty breathing, skin  rash, itching, swelling, or severe dizziness. If you notice any of these symptoms, seek medical help quickly.  Please speak with your doctor, nurse, or pharmacist if you have any questions about this medicine.  IMPORTANT NOTE: This document tells you briefly how to take your medicine, but it does not tell you all there is to know about it. Your doctor or pharmacist may give you other documents about your medicine. Please talk to them if you have any questions. Always follow their advice.  There is a more complete description of this medicine available in English. Scan this code on your smartphone or tablet or use the web address below. You can also ask your pharmacist for a printout. If you have any questions, please ask your pharmacist.  The display and use of this drug information is subject to Terms of Use.  More information about ZOLEDRONIC ACID SOLUTION - INJECTION      Copyright(c) 2024 [employer].  Selected from data included with permission and copyright by [employer]. This copyrighted material has been downloaded from a licensed data provider and is not for distribution, except as may be authorized by the applicable terms of use.  Conditions of Use: The information in this database is intended to supplement, not substitute for the expertise and judgment of healthcare professionals. The information is not intended to cover all possible uses, directions, precautions, drug interactions or adverse effects nor should it be construed to indicate that use of a particular drug is safe, appropriate or effective for you or anyone else. A healthcare professional should be consulted before taking any drug, changing any diet or commencing or discontinuing any course of treatment. The display and use of this drug information is subject to express Terms of Use.  Care instructions adapted under license by your healthcare professional. If you have questions about a medical condition or this instruction,  always ask your healthcare professional. [employer] disclaims any warranty or liability for your use of this information.

## 2024-11-12 NOTE — PROGRESS NOTES
Infusion Nursing Note:  Jennifer Wall presents today for Labs/Reclast.    Patient seen by provider today: No   present during visit today: Not Applicable.    Note: Jennifer arrived into the infusion center accompanied by her  for abs/Reclat today, VSS. Plan of care reviewed, they verbalized understanding of her plan of care and stated this is the first time of receiving Reclast. Patient education on reclast printed and reviewed with them.She acknowledged she taking Vit D/Calcium supplements.Reclast infused per order.     Intravenous Access:  Labs drawn without difficulty.  Peripheral IV placed.    Treatment Conditions:  Lab Results   Component Value Date     05/26/2022    POTASSIUM 3.5 05/26/2022    MAG 1.7 (L) 05/26/2022    CR 1.07 (H) 11/12/2024    RICARDO 10.0 11/12/2024    BILITOTAL 1.4 (H) 05/26/2022    ALBUMIN 4.2 05/26/2022    ALT 15 05/26/2022    AST 23 05/26/2022     Results reviewed, labs MET treatment parameters, ok to proceed with treatment.    Post Infusion Assessment:  Patient tolerated infusion without incident.  Site patent and intact, free from redness, edema or discomfort.  No evidence of extravasations.  Access discontinued per protocol.     Discharge Plan:   Discharge instructions reviewed with: Patient.  Patient and/or family verbalized understanding of discharge instructions and all questions answered.  Patient discharged in stable condition accompanied by: .  Departure Mode: Ambulatory.      Leslie Petersen RN